# Patient Record
Sex: MALE | Race: WHITE | NOT HISPANIC OR LATINO | Employment: FULL TIME | URBAN - METROPOLITAN AREA
[De-identification: names, ages, dates, MRNs, and addresses within clinical notes are randomized per-mention and may not be internally consistent; named-entity substitution may affect disease eponyms.]

---

## 2017-01-20 ENCOUNTER — GENERIC CONVERSION - ENCOUNTER (OUTPATIENT)
Dept: OTHER | Facility: OTHER | Age: 27
End: 2017-01-20

## 2017-12-29 ENCOUNTER — ALLSCRIPTS OFFICE VISIT (OUTPATIENT)
Dept: OTHER | Facility: OTHER | Age: 27
End: 2017-12-29

## 2017-12-29 LAB
FLUAV AG SPEC QL IA: NEGATIVE
INFLUENZA B AG (HISTORICAL): NEGATIVE

## 2017-12-30 NOTE — PROGRESS NOTES
Assessment   1  Acute sinusitis (461 9) (J01 90)   2  Flu-like symptoms (780 99) (R68 89)    Plan   Acute sinusitis    · Amoxicillin 500 MG Oral Capsule; TAKE 2 CAPSULE Twice daily  Flu-like symptoms    · Rapid Flu A and B- POC; Source:Nose; Status:Resulted - Requires Verification;   Done:    [id number] 12:00AM    Discussion/Summary      Patient is a 39-year-old male here with frontal tenderness, nasal congestion, thick postnasal drip and productive cough  On auscultation, his lungs are clear  His skin felt very clammy and he complains of achiness  I did do a flu screen and it was negative  Am treating her for an acute sinus infection with amoxicillin for 10 days  He can use Delsym or Mucinex over-the-counter for his cough and also I recommended Nasacort or Flonase nasal spray  He should push fluids and may take Tylenol or Advil for fever  He should call if he has no improvement  Possible side effects of new medications were reviewed with the patient/guardian today  The treatment plan was reviewed with the patient/guardian  The patient/guardian understands and agrees with the treatment plan      Chief Complaint   Pt presents for cough, congestion, and body aches x1wk  Pt states he took Delcum and Tylenol w/ little relief  History of Present Illness   HPI: Patient had a cold  last week which resolved and now he has cough productive of yellow mucous  , ache, tired      Review of Systems        Constitutional: feeling tired, but-- no fever-- and-- no chills  ENT: nasal discharge-- and-- Headache across frontal region  , but-- no earache-- and-- no sore throat  Cardiovascular: no complaints of slow or fast heart rate, no chest pain, no palpitations, no leg claudication or lower extremity edema  Respiratory: cough  Gastrointestinal: diarrhea, but-- no nausea-- and-- no vomiting  Active Problems   1  Acute upper respiratory infection (465 9) (J06 9)   2   Psoriasis (696 1) (L40 9)    Past Medical History   1  Acute upper respiratory infection (465 9) (J06 9)   2  History of Acute upper respiratory infection (465 9) (J06 9)   3  History of Childhood asthma (493 00) (J45 909)   4  History of Cough (786 2) (R05)   5  History of common cold (V12 09) (Z86 19)   6  History of Need for influenza vaccination (V04 81) (Z23)  Active Problems And Past Medical History Reviewed: The active problems and past medical history were reviewed and updated today  Family History   Father    1  Family history of Psoriasis (696 1) (L40 9)  Paternal Grandfather    2  Family history of Diabetes mellitus (250 00) (E11 9)    Social History    · Never a smoker   · Occasional alcohol use  The social history was reviewed and updated today  The social history was reviewed and is unchanged  Current Meds      The medication list was reviewed and updated today  Allergies   1  No Known Drug Allergies  2  No Known Food Allergies    Vitals    Recorded: 81Btk2425 08:38AM   Temperature 97 8 F, Tympanic   Heart Rate 90   Pulse Quality Normal   Respiration Quality Normal   Respiration 18   Systolic 090, Sitting   Diastolic 80, Sitting   Height 5 ft 10 in   Weight 191 lb    BMI Calculated 27 41   BSA Calculated 2 05   O2 Saturation 98, RA   Pain Scale 2   Waist Circumference 2     Physical Exam        Constitutional      General appearance: No acute distress, well appearing and well nourished  Ears, Nose, Mouth, and Throat      External inspection of ears and nose: Normal        Otoscopic examination: Tympanic membrance translucent with normal light reflex  Canals patent without erythema  Nasal mucosa, septum, and turbinates: Abnormal   There was a mucoid discharge from both nares  The bilateral nasal mucosa was red  Oropharynx: Abnormal   The posterior pharynx with post nasal drainage  Pulmonary      Respiratory effort: No increased work of breathing or signs of respiratory distress         Auscultation of lungs: Clear to auscultation, equal breath sounds bilaterally, no wheezes, no rales, no rhonci  Cardiovascular      Auscultation of heart: Normal rate and rhythm, normal S1 and S2, without murmurs  Examination of extremities for edema and/or varicosities: Normal        Lymphatic      Palpation of lymph nodes in neck: No lymphadenopathy  Musculoskeletal      Gait and station: Normal        Skin      Skin and subcutaneous tissue: Abnormal  -- Clammy        Psychiatric      Orientation to person, place and time: Normal        Mood and affect: Normal           Signatures    Electronically signed by : Marcio Cifuentes DO; Dec 29 2017  9:33AM EST                       (Author)

## 2018-01-04 ENCOUNTER — ALLSCRIPTS OFFICE VISIT (OUTPATIENT)
Dept: OTHER | Facility: OTHER | Age: 28
End: 2018-01-04

## 2018-01-06 NOTE — PROGRESS NOTES
Assessment   1  Acute bronchitis, unspecified organism (466 0) (J20 9)    Plan   Acute bronchitis, unspecified organism    · PredniSONE 10 MG Oral Tablet; 6-5-4-3-2-1 taper with food   · Promethazine-Codeine 6 25-10 MG/5ML Oral Syrup; TAKE 1 TSP Every 6 hours    PRN    Discussion/Summary      Discussed condition with pt  His sinusitis is resolving but in the interim between his last visit and today, he appears to have developed bronchitis  I would rec  he continue Amoxicillin until gone and I will prescribe him an oral Prednisone taper and a cough syrup and he is to hydrate, rest, and observe  He is to F/U if symptoms continue to persist     Possible side effects of new medications were reviewed with the patient/guardian today  The treatment plan was reviewed with the patient/guardian  The patient/guardian understands and agrees with the treatment plan      Chief Complaint   patient presents for follow up on 12/29/17 visit  Reports he still has his cough and it has gotten progressively worse  He reports he is still continuing his amox and his sinus symptoms has improved      History of Present Illness   Pt  presents for F/U from visit on 12/29/17  He has been taking Amoxicillin for the past week and his sinuses are improved but his cough has actually gotten worse  He reports the cough was productive but now it is dry  He has minimal mucus/congestion  Denies fever, chills  He is taking Delsym and Mucinex which are not helping  Review of Systems        Constitutional: No fever or chills, feels well, no tiredness, no recent weight gain or weight loss  ENT: no complaints of earache, no hearing loss, no nosebleeds, no nasal discharge, no sore throat, no hoarseness  Cardiovascular: No complaints of slow heart rate, no fast heart rate, no chest pain, no palpitations, no leg claudication, no lower extremity  Respiratory: as noted in HPI        Gastrointestinal: No complaints of abdominal pain, no constipation, no nausea or vomiting, no diarrhea or bloody stools  Genitourinary: No complaints of dysuria, no incontinence, no hesitancy, no nocturia, no genital lesion, no testicular pain  Past Medical History   1  Acute upper respiratory infection (465 9) (J06 9)   2  History of Acute upper respiratory infection (465 9) (J06 9)   3  History of Childhood asthma (493 00) (J45 909)   4  History of Cough (786 2) (R05)   5  History of common cold (V12 09) (Z86 19)   6  History of Need for influenza vaccination (V04 81) (Z23)    Family History   Father    1  Family history of Psoriasis (696 1) (L40 9)  Paternal Grandfather    2  Family history of Diabetes mellitus (250 00) (E11 9)    Social History    · Never a smoker   · Occasional alcohol use  The social history was reviewed and is unchanged  Current Meds    1  Amoxicillin 500 MG Oral Capsule; TAKE 2 CAPSULE Twice daily; Therapy: [id number] to ([phone] [id number])  Requested for: [phone]; Last     Rx:[id number] Ordered     The medication list was reviewed and updated today  Allergies   1  No Known Drug Allergies  2  No Known Food Allergies    Vitals   Vital Signs    Recorded: 17MGQ5393 10:51AM   Temperature 98 2 F, Tympanic   Heart Rate 84   Systolic 835   Diastolic 68   Height 5 ft    Weight 190 lb    BMI Calculated 37 11   BSA Calculated 1 83   O2 Saturation 99     Physical Exam        Constitutional      General appearance: No acute distress, well appearing and well nourished  Ears, Nose, Mouth, and Throat      External inspection of ears and nose: Normal        Otoscopic examination: Tympanic membrance translucent with normal light reflex  Canals patent without erythema  Nasal mucosa, septum, and turbinates: Normal without edema or erythema  Oropharynx: Abnormal  -- PND; no exudates  Pulmonary      Respiratory effort: No increased work of breathing or signs of respiratory distress         Auscultation of lungs: Abnormal  -- B/L diffuse coarse breath sounds heard throughout; no wheezes  Cardiovascular      Auscultation of heart: Normal rate and rhythm, normal S1 and S2, without murmurs  Lymphatic      Palpation of lymph nodes in neck: No lymphadenopathy  Psychiatric      Orientation to person, place and time: Normal        Mood and affect: Normal        Additional Exam:  Vital signs were reviewed; neck supple  Signatures    Electronically signed by : [facility]; Jan 5 2018  8:35AM EST                       (Author)     Electronically signed by :  Gifty Stovall DO; Jan 5 2018 12:50PM EST                       (Author)

## 2018-01-22 VITALS
TEMPERATURE: 97.8 F | DIASTOLIC BLOOD PRESSURE: 80 MMHG | OXYGEN SATURATION: 98 % | HEART RATE: 90 BPM | WEIGHT: 191 LBS | RESPIRATION RATE: 18 BRPM | HEIGHT: 70 IN | BODY MASS INDEX: 27.35 KG/M2 | SYSTOLIC BLOOD PRESSURE: 118 MMHG

## 2018-01-23 VITALS
DIASTOLIC BLOOD PRESSURE: 68 MMHG | WEIGHT: 190 LBS | BODY MASS INDEX: 37.3 KG/M2 | SYSTOLIC BLOOD PRESSURE: 116 MMHG | HEART RATE: 84 BPM | TEMPERATURE: 98.2 F | OXYGEN SATURATION: 99 % | HEIGHT: 60 IN

## 2018-02-08 ENCOUNTER — TRANSCRIBE ORDERS (OUTPATIENT)
Dept: ADMINISTRATIVE | Facility: HOSPITAL | Age: 28
End: 2018-02-08

## 2018-02-08 ENCOUNTER — OFFICE VISIT (OUTPATIENT)
Dept: FAMILY MEDICINE CLINIC | Facility: CLINIC | Age: 28
End: 2018-02-08
Payer: COMMERCIAL

## 2018-02-08 ENCOUNTER — APPOINTMENT (OUTPATIENT)
Dept: RADIOLOGY | Facility: MEDICAL CENTER | Age: 28
End: 2018-02-08
Payer: COMMERCIAL

## 2018-02-08 VITALS
HEIGHT: 68 IN | DIASTOLIC BLOOD PRESSURE: 80 MMHG | WEIGHT: 195 LBS | BODY MASS INDEX: 29.55 KG/M2 | RESPIRATION RATE: 18 BRPM | HEART RATE: 82 BPM | OXYGEN SATURATION: 98 % | TEMPERATURE: 98.9 F | SYSTOLIC BLOOD PRESSURE: 118 MMHG

## 2018-02-08 DIAGNOSIS — J40 BRONCHITIS: Primary | ICD-10-CM

## 2018-02-08 DIAGNOSIS — L40.9 PSORIASIS: ICD-10-CM

## 2018-02-08 DIAGNOSIS — J40 BRONCHITIS: ICD-10-CM

## 2018-02-08 PROCEDURE — 71046 X-RAY EXAM CHEST 2 VIEWS: CPT

## 2018-02-08 PROCEDURE — 99213 OFFICE O/P EST LOW 20 MIN: CPT | Performed by: FAMILY MEDICINE

## 2018-02-08 RX ORDER — ADALIMUMAB 40MG/0.8ML
KIT SUBCUTANEOUS
COMMUNITY
Start: 2017-12-12

## 2018-02-08 RX ORDER — MONTELUKAST SODIUM 10 MG/1
10 TABLET ORAL
Qty: 30 TABLET | Refills: 0 | Status: SHIPPED | OUTPATIENT
Start: 2018-02-08 | End: 2022-02-04 | Stop reason: SDDI

## 2018-02-08 NOTE — PROGRESS NOTES
Assessment/Plan:  Patient continues with bronchitis or inflammatory cough  I have given him an inhaler to use - Symbicort and prescribed Singulair to help with cough and inflammation  I am checking an xray since he has had his cough for greater than a month  He takes Humira for psoraisis  I am going to hold off on antibiotics until I have the xray results  No problem-specific Assessment & Plan notes found for this encounter  Diagnoses and all orders for this visit:    Bronchitis  -     XR chest pa & lateral; Future  -     montelukast (SINGULAIR) 10 mg tablet; Take 1 tablet (10 mg total) by mouth daily at bedtime for 30 days    Psoriasis    Other orders  -     HUMIRA PEN 40 MG/0 8ML PNKT;           Vitals:    02/08/18 1356   BP: 118/80   Pulse: 82   Resp: 18   Temp: 98 9 °F (37 2 °C)   SpO2: 98%       Subjective:      Patient ID: Dimerick Delay Friday is a 28 y o  male  Patient is here with cough  I had seen him at the end of December and treated him for a sinus infection  His sinus pressure is gone, but the cough lingers  He was seen at the beginning of January and put on a steroid taper  He took the Prednisone as directed  His cough is worse when he is active  Not bad when laying down  Had asthma as a child  The following portions of the patient's history were reviewed and updated as appropriate: allergies, current medications, past family history, past medical history, past social history, past surgical history and problem list     Review of Systems   Constitutional: Positive for fatigue  Negative for chills and fever  HENT: Negative for congestion and sore throat  Respiratory: Positive for cough  Cardiovascular: Negative for chest pain and palpitations  Gastrointestinal: Negative  Genitourinary: Negative  Neurological: Positive for headaches (from coughing hard)  Psychiatric/Behavioral: Negative            Objective:     Physical Exam   Constitutional: He is oriented to person, place, and time  He appears well-developed and well-nourished  HENT:   Head: Normocephalic  Right Ear: Tympanic membrane normal    Left Ear: Tympanic membrane normal    Nose: No mucosal edema or rhinorrhea  Mouth/Throat: Mucous membranes are normal  Posterior oropharyngeal erythema present  No oropharyngeal exudate  Neck: No thyromegaly present  Cardiovascular: Normal rate and regular rhythm  Pulmonary/Chest: Effort normal    Coarse breath sounds  Lymphadenopathy:     He has no cervical adenopathy  Neurological: He is alert and oriented to person, place, and time  Skin: Skin is warm and dry  Psychiatric: He has a normal mood and affect

## 2018-02-08 NOTE — PATIENT INSTRUCTIONS
Use Symbicort inhaler - two puffs twice a day  Rinse mouth after using  Take singulair once a day - (montelukast)  We will call with chest xray results

## 2018-02-09 DIAGNOSIS — J40 BRONCHITIS: Primary | ICD-10-CM

## 2018-02-09 RX ORDER — AZITHROMYCIN 250 MG/1
TABLET, FILM COATED ORAL
Qty: 6 TABLET | Refills: 0 | Status: SHIPPED | OUTPATIENT
Start: 2018-02-09 | End: 2018-02-13

## 2018-02-09 RX ORDER — BUDESONIDE AND FORMOTEROL FUMARATE DIHYDRATE 160; 4.5 UG/1; UG/1
2 AEROSOL RESPIRATORY (INHALATION) 2 TIMES DAILY
Qty: 1 INHALER | Refills: 0 | Status: SHIPPED | COMMUNITY
Start: 2018-02-09 | End: 2022-02-04 | Stop reason: SDDI

## 2020-02-20 ENCOUNTER — OFFICE VISIT (OUTPATIENT)
Dept: FAMILY MEDICINE CLINIC | Facility: CLINIC | Age: 30
End: 2020-02-20
Payer: COMMERCIAL

## 2020-02-20 VITALS
HEIGHT: 68 IN | WEIGHT: 190 LBS | BODY MASS INDEX: 28.79 KG/M2 | DIASTOLIC BLOOD PRESSURE: 74 MMHG | HEART RATE: 121 BPM | OXYGEN SATURATION: 95 % | TEMPERATURE: 102 F | SYSTOLIC BLOOD PRESSURE: 108 MMHG

## 2020-02-20 DIAGNOSIS — R68.89 FLU-LIKE SYMPTOMS: ICD-10-CM

## 2020-02-20 DIAGNOSIS — Z11.4 ENCOUNTER FOR SCREENING FOR HIV: ICD-10-CM

## 2020-02-20 DIAGNOSIS — J01.90 ACUTE NON-RECURRENT SINUSITIS, UNSPECIFIED LOCATION: Primary | ICD-10-CM

## 2020-02-20 DIAGNOSIS — R74.8 ELEVATED LIVER ENZYMES: ICD-10-CM

## 2020-02-20 PROCEDURE — 4004F PT TOBACCO SCREEN RCVD TLK: CPT | Performed by: FAMILY MEDICINE

## 2020-02-20 PROCEDURE — 99214 OFFICE O/P EST MOD 30 MIN: CPT | Performed by: FAMILY MEDICINE

## 2020-02-20 PROCEDURE — 3008F BODY MASS INDEX DOCD: CPT | Performed by: FAMILY MEDICINE

## 2020-02-20 RX ORDER — OSELTAMIVIR PHOSPHATE 75 MG/1
75 CAPSULE ORAL EVERY 12 HOURS SCHEDULED
Qty: 10 CAPSULE | Refills: 0 | Status: SHIPPED | OUTPATIENT
Start: 2020-02-20 | End: 2020-02-25

## 2020-02-20 RX ORDER — CEFUROXIME AXETIL 500 MG/1
500 TABLET ORAL EVERY 12 HOURS SCHEDULED
Qty: 20 TABLET | Refills: 0 | Status: SHIPPED | OUTPATIENT
Start: 2020-02-20 | End: 2020-03-01

## 2020-02-21 NOTE — PROGRESS NOTES
Assessment/Plan:  Patient is here with acute sinusitis and possible flu - I did prescribe Tamiflu to decrease flu symptoms and treated him for a secondary sinus infection with an antibiotic  He also has elevated liver enzymes - he sees dermatology and is on Humira for psoriasis  I have ordered repeat blood work - liver enzymes, along with check of hepatitis  If liver enzymes still elevated and Hepatitis testing negative, will check liver US     Diagnoses and all orders for this visit:    Acute non-recurrent sinusitis, unspecified location  -     cefuroxime (CEFTIN) 500 mg tablet; Take 1 tablet (500 mg total) by mouth every 12 (twelve) hours for 10 days    Flu-like symptoms  -     oseltamivir (TAMIFLU) 75 mg capsule; Take 1 capsule (75 mg total) by mouth every 12 (twelve) hours for 5 days    Elevated liver enzymes  -     Comprehensive metabolic panel; Future  -     Hepatitis C Ab W/Refl To HCV RNA, Qn, PCR; Future  -     Hepatitis B surface antigen; Future  -     Hepatitis B surface antibody; Future  -     Hepatitis A antibody, IgM; Future  -     Lipid Panel with Direct LDL reflex; Future    Encounter for screening for HIV  -     HIV 1/2 AG-AB combo; Future          Subjective:   Chief Complaint   Patient presents with    Follow-up     review blood work   Cough     Started aboout 2 weeks ago    Fever     started about 3 days ago    Fatigue     started about 3 days ago    Generalized Body Aches     started about 3 days ago        Patient ID: Ramiro Osler Friday is a 26 y o  male  Patient is here for follow up of abnormal blood work done by dermatology  Was seeing  Derm to restart Humira  Blood work showed his lier enzymes are elvated  But started with cough for the past two weeks  Then two to three days ago bad cough, fever and aches  Drinks 2-3 glasses of beer once a week  Been taking Robitussin and Tylenol but not before the blood work  Works in a warehouse        The following portions of the patient's history were reviewed and updated as appropriate: allergies, current medications, past family history, past medical history, past social history, past surgical history and problem list     Review of Systems   Constitutional: Negative for chills and fever  HENT: Negative for congestion and sore throat  Respiratory: Positive for cough  Negative for chest tightness  Cardiovascular: Negative for chest pain and palpitations  Gastrointestinal: Negative for abdominal pain, constipation, diarrhea, nausea and rectal pain  Genitourinary: Negative for difficulty urinating  Skin: Negative  Neurological: Negative for dizziness and headaches  Psychiatric/Behavioral: Negative  Objective:      /74 (BP Location: Left arm, Patient Position: Sitting, Cuff Size: Adult)   Pulse (!) 121   Temp (!) 102 °F (38 9 °C) (Tympanic)   Ht 5' 8" (1 727 m)   Wt 86 2 kg (190 lb)   SpO2 95%   BMI 28 89 kg/m²          Physical Exam   Constitutional: He is oriented to person, place, and time  He appears well-nourished  No distress  HENT:   Right Ear: Tympanic membrane normal    Left Ear: Tympanic membrane normal    Nose: Right sinus exhibits maxillary sinus tenderness and frontal sinus tenderness  Left sinus exhibits maxillary sinus tenderness and frontal sinus tenderness  Cardiovascular: Normal rate, regular rhythm and normal heart sounds  Pulmonary/Chest: Effort normal and breath sounds normal    Lymphadenopathy:     He has no cervical adenopathy  Neurological: He is alert and oriented to person, place, and time  Skin: Skin is warm and dry  Psychiatric: He has a normal mood and affect  Nursing note and vitals reviewed

## 2020-02-28 ENCOUNTER — TELEPHONE (OUTPATIENT)
Dept: FAMILY MEDICINE CLINIC | Facility: CLINIC | Age: 30
End: 2020-02-28

## 2020-02-28 NOTE — TELEPHONE ENCOUNTER
PT was seen by you on 2/20 and dx with flu  Pt needs forms filled out for work  Placing on your desk

## 2020-03-02 ENCOUNTER — TELEPHONE (OUTPATIENT)
Dept: FAMILY MEDICINE CLINIC | Facility: CLINIC | Age: 30
End: 2020-03-02

## 2020-03-02 NOTE — TELEPHONE ENCOUNTER
Patient states that he was sen for the flu 2/20/20 and is feeling much better after taking the Tamiflu  States he dropped off paperwork Friday to clear him to return to work 3/4/20  Would like to know when those forms are completed

## 2020-03-04 ENCOUNTER — TELEPHONE (OUTPATIENT)
Dept: FAMILY MEDICINE CLINIC | Facility: CLINIC | Age: 30
End: 2020-03-04

## 2020-03-04 NOTE — TELEPHONE ENCOUNTER
There was a possible miscommunication with out of work paperwork, pt was not excused for today  Pt will speak with employer and decide if he needs a note explaining if he is excused for today or not

## 2020-11-24 ENCOUNTER — TELEPHONE (OUTPATIENT)
Dept: PSYCHIATRY | Facility: CLINIC | Age: 30
End: 2020-11-24

## 2021-01-29 ENCOUNTER — TELEMEDICINE (OUTPATIENT)
Dept: FAMILY MEDICINE CLINIC | Facility: CLINIC | Age: 31
End: 2021-01-29
Payer: COMMERCIAL

## 2021-01-29 DIAGNOSIS — J01.00 ACUTE NON-RECURRENT MAXILLARY SINUSITIS: Primary | ICD-10-CM

## 2021-01-29 PROCEDURE — 99213 OFFICE O/P EST LOW 20 MIN: CPT | Performed by: FAMILY MEDICINE

## 2021-01-29 PROCEDURE — 4004F PT TOBACCO SCREEN RCVD TLK: CPT | Performed by: FAMILY MEDICINE

## 2021-01-29 RX ORDER — AMOXICILLIN AND CLAVULANATE POTASSIUM 875; 125 MG/1; MG/1
1 TABLET, FILM COATED ORAL EVERY 12 HOURS SCHEDULED
Qty: 14 TABLET | Refills: 0 | Status: SHIPPED | OUTPATIENT
Start: 2021-01-29 | End: 2021-02-05

## 2021-01-29 NOTE — PROGRESS NOTES
Virtual Regular Visit      Assessment/Plan:    Problem List Items Addressed This Visit     None      Visit Diagnoses     Acute non-recurrent maxillary sinusitis    -  Primary    Relevant Medications    amoxicillin-clavulanate (AUGMENTIN) 875-125 mg per tablet       will give Rx for Augmentin b i d  for 7 days  Drink plenty of fluids  Recommend self quarantine in still symptoms have improved  Call further problems         Reason for visit is No chief complaint on file  Encounter provider Andrea Gold DO    Provider located at [place] [address]  [address] [place] [address] [address] [facility] Shayne Coronado [facility] [address]  [phone]      Recent Visits  No visits were found meeting these conditions  Showing recent visits within past 7 days and meeting all other requirements     Today's Visits  Date Type Provider Dept   01/29/21 [place]medicine [place] [id number] Gary Phillips today's visits and meeting all other requirements     Future Appointments  No visits were found meeting these conditions  Showing future appointments within next 150 days and meeting all other requirements        The patient was identified by name and date of birth  Anay Hui Friday was informed that this is a telemedicine visit and that the visit is being conducted through [facility] and patient was informed that this is a secure, HIPAA-compliant platform  He agrees to proceed     My office door was closed  No one else was in the room  He acknowledged consent and understanding of privacy and security of the video platform  The patient has agreed to participate and understands they can discontinue the visit at any time  Patient is aware this is a billable service  Subjective  Anay Hui Friday is a 31 y o  male  See HPI   Patient complains of 2 week history of nasal congestion with dark mucus  Denies any fevers or chills    Denies any known exposure to COVID positive individual       Past Medical History: Diagnosis Date    Childhood asthma     Psoriasis     last assessed 9/4/14       Past Surgical History:   Procedure Laterality Date    HERNIA REPAIR         Current Outpatient Medications   Medication Sig Dispense Refill    amoxicillin-clavulanate (AUGMENTIN) 875-125 mg per tablet Take 1 tablet by mouth every 12 (twelve) hours for 7 days 14 tablet 0    budesonide-formoterol (SYMBICORT) 160-4 5 mcg/act inhaler Inhale 2 puffs 2 (two) times a day (Patient not taking: Reported on 2/20/2020) 1 Inhaler 0    HUMIRA PEN 40 MG/0 8ML PNKT       montelukast (SINGULAIR) 10 mg tablet Take 1 tablet (10 mg total) by mouth daily at bedtime for 30 days (Patient not taking: Reported on 2/20/2020) 30 tablet 0     No current facility-administered medications for this visit  No Known Allergies    Review of Systems   Constitutional: Negative for chills and fever  HENT: Positive for congestion and postnasal drip  Respiratory: Positive for cough  Negative for shortness of breath  Cardiovascular: Negative  Video Exam    There were no vitals filed for this visit  Physical Exam     I spent Eight minutes directly with the patient during this visit      VIRTUAL VISIT DISCLAIMER    Mao Shea Friday acknowledges that he has consented to an online visit or consultation  He understands that the online visit is based solely on information provided by him, and that, in the absence of a face-to-face physical evaluation by the physician, the diagnosis he receives is both limited and provisional in terms of accuracy and completeness  This is not intended to replace a full medical face-to-face evaluation by the physician  Mao Shea Friday understands and accepts these terms

## 2021-04-09 ENCOUNTER — OFFICE VISIT (OUTPATIENT)
Dept: FAMILY MEDICINE CLINIC | Facility: CLINIC | Age: 31
End: 2021-04-09
Payer: COMMERCIAL

## 2021-04-09 VITALS
WEIGHT: 181.4 LBS | HEART RATE: 74 BPM | OXYGEN SATURATION: 98 % | BODY MASS INDEX: 27.49 KG/M2 | HEIGHT: 68 IN | DIASTOLIC BLOOD PRESSURE: 78 MMHG | SYSTOLIC BLOOD PRESSURE: 118 MMHG | TEMPERATURE: 98.3 F

## 2021-04-09 DIAGNOSIS — Z13.1 SCREENING FOR DIABETES MELLITUS: ICD-10-CM

## 2021-04-09 DIAGNOSIS — Z13.220 SCREENING FOR LIPID DISORDERS: ICD-10-CM

## 2021-04-09 DIAGNOSIS — R35.0 URINARY FREQUENCY: Primary | ICD-10-CM

## 2021-04-09 DIAGNOSIS — Z13.0 SCREENING FOR DEFICIENCY ANEMIA: ICD-10-CM

## 2021-04-09 LAB
SL AMB  POCT GLUCOSE, UA: NEGATIVE
SL AMB LEUKOCYTE ESTERASE,UA: NEGATIVE
SL AMB POCT BILIRUBIN,UA: NEGATIVE
SL AMB POCT BLOOD,UA: NEGATIVE
SL AMB POCT CLARITY,UA: CLEAR
SL AMB POCT COLOR,UA: YELLOW
SL AMB POCT KETONES,UA: NEGATIVE
SL AMB POCT NITRITE,UA: NEGATIVE
SL AMB POCT PH,UA: 5
SL AMB POCT SPECIFIC GRAVITY,UA: >=1.03
SL AMB POCT URINE PROTEIN: NEGATIVE
SL AMB POCT UROBILINOGEN: 0.2

## 2021-04-09 PROCEDURE — 1036F TOBACCO NON-USER: CPT | Performed by: NURSE PRACTITIONER

## 2021-04-09 PROCEDURE — 3008F BODY MASS INDEX DOCD: CPT | Performed by: NURSE PRACTITIONER

## 2021-04-09 PROCEDURE — 99213 OFFICE O/P EST LOW 20 MIN: CPT | Performed by: NURSE PRACTITIONER

## 2021-04-09 PROCEDURE — 81003 URINALYSIS AUTO W/O SCOPE: CPT | Performed by: NURSE PRACTITIONER

## 2021-04-09 PROCEDURE — 3725F SCREEN DEPRESSION PERFORMED: CPT | Performed by: NURSE PRACTITIONER

## 2021-04-09 NOTE — PROGRESS NOTES
[facility]    ASSESSMENT AND PLAN     1  Urinary frequency  Symptoms reviewed with pt and girlfriend  Urine dip negative  Pt ingests a very large amount of caffeine daily  Advised this may cause bladder irritation and could be the cause of his symptoms  Note his food diet is poor as well  Discussed proper dietary intake  Advised he keep a food/lquid diet and monitor for symptoms  Advised to decrease caffeine intake and increase water  Goal of 6-8 bottles per day  Dietary changes should help regular stool consistency as well as decrease urinary symtpoms  Believe blood spots are due to straining/constipation (only occur at those times)  Consider referral if returns  Assessment today unremarkable  He is overdue for comprehensive physical and lab work  Orders placed today  Advised he return in the next few weeks for complete exam after completing labs  He treats with [facility] for psoriasis and has run out of his Humira  States he will likely need lab work through them and would prefer to wait and it all completed at the same time  He has not scheduled with Derm yet so this may take several weeks to get in  Pt was advised to return for further work up sooner if symptoms persist, change or worsen - despite lifestyle changes as discussed above  Pt and girlfriend state understanding     - POCT urine dip auto non-scope    2  Screening for diabetes mellitus    - Comprehensive metabolic panel; Future    3  Screening for deficiency anemia    - CBC and differential; Future    4  Screening for lipid disorders    - Lipid panel; Future            SUBJECTIVE       Patient ID: Yuki Alvarado Friday is a 31 y o  male  Chief Complaint   Patient presents with    Urinary Incontinence     Patient had episode last week when he had no warning, and started leaking urine    Urinary Frequency     patient has intermittent frequency       HISTORY OF PRESENT ILLNESS    Patient presents today with urinary complaint    Symptoms intermittent present for the last few weeks  Notes urinary frequency/ urgency  Feels like he leaks urine at times and that he does not completely empty his bladder  Denies any pain or burning  Admits to poor diet  States he drinks 1-2 energy drinks per day ) [employer] ), and then drinks either ice tea or hot tea with caffeine the remainder of the day  Drinks very little water  Complains also of feeling like his bowels "give up"  He feels he does not always empty his bowels, and takes several wipes before the toilet tissue is clean  States he does have daily bowel movements  Notes his bowels to be hard and difficult to pass 1-2 times per week  Otherwise his bowels seem more "mushy"  Color is brown  States he will occasionally have a smear of blood on the toilet tissue after straining hard to evacuate  No blood in the stool or in the toilet  Only notes the smear of blood on the toilet tissue after hard stools only and when straining to pass  Mild pain at this time only  This is not new  Denies any rectal pain, bleeding, itching, burning otherwise  Admits to a poor diet  Eats a lot of fast foods (hamburgers/pizza)  Will occasionally eat a salad  Urinary Frequency   Associated symptoms include frequency and urgency  Pertinent negatives include no flank pain, nausea or vomiting  The following portions of the patient's history were reviewed and updated as appropriate: allergies, current medications, past family history, past medical history, past social history, past surgical history and problem list     REVIEW OF SYSTEMS  Review of Systems   Gastrointestinal: Positive for constipation  Negative for diarrhea, nausea and vomiting  Genitourinary: Positive for frequency and urgency  Negative for decreased urine volume, difficulty urinating, dysuria and flank pain         OBJECTIVE      VITAL SIGNS  /78 (BP Location: Right arm)   Pulse 74   Temp 98 3 °F (36 8 °C)   Ht 5' 7 72" (1 72 m)   Wt 82 3 kg (181 lb 6 4 oz)   SpO2 98%   BMI 27 81 kg/m²     CURRENT MEDICATIONS    Current Outpatient Medications:     HUMIRA PEN 40 MG/0 8ML PNKT, , Disp: , Rfl:     budesonide-formoterol (SYMBICORT) 160-4 5 mcg/act inhaler, Inhale 2 puffs 2 (two) times a day (Patient not taking: Reported on 2/20/2020), Disp: 1 Inhaler, Rfl: 0    montelukast (SINGULAIR) 10 mg tablet, Take 1 tablet (10 mg total) by mouth daily at bedtime for 30 days (Patient not taking: Reported on 2/20/2020), Disp: 30 tablet, Rfl: 0      PHYSICAL EXAMINATION   Physical Exam  Vitals signs and nursing note reviewed  Constitutional:       General: He is not in acute distress  Appearance: Normal appearance  He is not ill-appearing  Abdominal:      Tenderness: There is no abdominal tenderness  There is no right CVA tenderness or left CVA tenderness  Genitourinary:     Rectum: No anal fissure or external hemorrhoid  Skin:     General: Skin is warm and dry  Neurological:      Mental Status: He is alert and oriented to person, place, and time     Psychiatric:         Attention and Perception: Attention normal          Mood and Affect: Mood normal          Speech: Speech normal          Behavior: Behavior normal

## 2021-04-20 ENCOUNTER — TELEPHONE (OUTPATIENT)
Dept: PSYCHIATRY | Facility: CLINIC | Age: 31
End: 2021-04-20

## 2021-04-22 ENCOUNTER — TELEPHONE (OUTPATIENT)
Dept: PSYCHIATRY | Facility: CLINIC | Age: 31
End: 2021-04-22

## 2021-04-22 NOTE — TELEPHONE ENCOUNTER
Behavorial Health Outpatient Intake Questions    Referred by: Olena Boateng     Please advised interviewee that they need to answer all questions truthfully to allow for best care and any misrepresentations of information may affect their ability to be seen at this clinic   => Was this discussed? Yes     Behavorial Health Outpatient Intake History -     Presenting Problem (in patient's words):   Patient has been seeing his therapist for about 1 year now  Patient anxiety and main thing is experiencing "bubble thinking"  Patient states "has found himself over thinking a lot"    Are there any developmental disabilities? ? If yes, can they speak to you on the phone? If they are too limited to speak to you on phone, refer out No    Are you taking any psychiatric medications? No    => If yes, who prescribes? If yes, are they injectable medications? Does the patient have a language barrier or hearing impairment? No    Have you been treated at [facility] by a therapist or a doctor in the past? If yes, who? Yes    Has the patient been hospitalized for mental health? No   If yes, how long ago was last hospitalization and where was it? Do you actively use alcohol or marijuana or illegal substances? If yes, what and how much - refer out to Drug and alcohol treatment if use is excessive or daily use of illegal substances No concerns of substance abuse are reported  Do you have a community treatment team or ? No    Legal History-     Does the patient have any history of arrests, detention/half-way time, or DUIs? No  If Yes-  1) What types of charges? 2) When were they last incarcerated? 3) Are they currently on parole or probation? Minor Child-    Who has custody of the child? Is there a custody agreement? If there is a custody agreement remind parent that they must bring a copy to the first appt or they will not be seen       Intake Team, please check with provider before scheduling if flags come up such as:  - complex case  - legal history (other than DUI)  - communication barrier concerns are present  - if, in your judgment, this needs further review    ACCEPTED as a patient Yes  => Appointment Date: 09/03/2021 at 2:00 p m with Dr Hunter Bradley? No    Name of Insurance Co: [employer] ID# [id number]  Insurance Phone #  If ins is primary or secondary  If patient is a minor, parents information such as Name, D  O B of guarantor

## 2021-09-02 NOTE — PSYCH
[facility]    Name and Date of Birth:  Joshua Bi Friday 27 y o  1990 MRN: [id number]    Date of Visit: September 3, 2021    Reason for visit:   Chief Complaint   Patient presents with   [facility]    Medication Management    Anxiety       HPI:     Joshua Bi Friday is a 27 y o   male, single, domiciled w/ a roommate, employed at [employer], w/ [address] [place] [address] [employer] [facility] of psoriasis and bronchitis and PPH of anxiety, no prior psychiatric admissions, one prior SA (by drinking alcohol and cutting in 2016), no h/o self-injurious behavior, in individual therapy since 2020 by Laney Bethea who presented to the mental health clinic for the initial intake and psychiatric evaluation on 9/3/2021 as referred by his therapist      The pt was visited in the clinic; chart reviewed  Presented calm, cooperative and well related, casually dressed w/ good hygiene, ling hear, bearded, wearing a facemask and eye-glasses, good eye contact, anxious mood, constricted affect, talking in normal tone, volume and amount, w/ linear thought process, fair insight and judgement  He stated that he "tend[s] to obsessively overthink things"  For example his roommate bought him a bag of chips, and then he startrd thinking if he  wanted him to do something for him, or buy something for him, etc and he keeps thinking about several scenarios  He noted that he noticed this pattern started in high school, and has been the same until now  He noted that his ex-friend was cheating on him and he decided to "drink myself to death" as he drank from 6 AM until night and then cut his foot, but did not end up to the hospital, and since then tried to seek help  He has started individual therapy for past two years  He noticed "less self-doubts" and uses mindfulness to feel calmer and remain in control  He scored his mood 6-7/10 (10 for the best)   Endorsed good energy level and appetite  Reported initial insomnia at times, but in general sleeps for ~8 hours  Enjoys playing video games, plays Interwise (recenly less motivated though) and Siperian  Denied anhedonia, hopelessness, helplessness, worthlessness or feeling guilty (since started therapy)  Reported arachnophobia  No panic attacks reported  Denied A/VH  No manic sxs, paranoid ideations or fixed delusions were elicited  Vehemently denied SI/HI, intent or plan upon direct inquiry at this time  Drinks 1-2 cups of coffee at times  Denied smoking cigarettes (quit 1 year ago)  Reported h/o drinking daily in the past during the period led up to SA  Drinks 3-4 drinks once ot twice a month  Denied other illicit substance use  Reported h/o depression in mother and maternal side  No known FH of suicide  Denied h/o physical or sexual abuse  Denied any history of eating disorder  Reported mild obsessive/compulsive sxs as checking lights and oven 3-4 times before leaving  The patient was educated about the eNovance  and Environmental Approach to the mental health  Also, was educated about the importance of sleep hygiene, mindfulness, meditation and breathing techniques, and recommended to use Mindfulness  application and White noise for insomnia  The patient was receptive to the education  Will continue individual therapy  Psycho-education regarding SSRI's including Lexapro indications, benefits, risks, side effects, and alternative options provided to the patient, and the importance of the compliance with psychiatric treatment reiterated  The patient verbalized understanding and agreed to the proposed regimen  Review Of Systems:  Pertinent items are noted in HPI; all others are negative; no recent changes in medications or health status reported     PHQ-9 Depression Screening    PHQ-9:   Frequency of the following problems over the past two weeks:      Little interest or pleasure in doing things: 2 - more than half the days  Feeling down, depressed, or hopeless: 1 - several days  Trouble falling or staying asleep, or sleeping too much: 1 - several days  Feeling tired or having little energy: 1 - several days  Poor appetite or overeatin - not at all  Feeling bad about yourself - or that you are a failure or have let yourself or your family down: 2 - more than half the days  Trouble concentrating on things, such as reading the newspaper or watching television: 0 - not at all  Moving or speaking so slowly that other people could have noticed  Or the opposite - being so fidgety or restless that you have been moving around a lot more than usual: 0 - not at all  Thoughts that you would be better off dead, or of hurting yourself in some way: 0 - not at all  PHQ-2 Score: 3  PHQ-9 Score: 7         DEBORA-7 Flowsheet Screening      Most Recent Value   Over the last 2 weeks, how often have you been bothered by any of the following problems?    Feeling nervous, anxious, or on edge  0   Not being able to stop or control worrying  3   Worrying too much about different things  3   Trouble relaxing  2   Being so restless that it is hard to sit still  0   Becoming easily annoyed or irritable  1   Feeling afraid as if something awful might happen  2   DEBORA-7 Total Score  11            Past Psychiatric History:     Past Inpatient Psychiatric Treatment:   No history of past inpatient psychiatric admissions  Past Outpatient Psychiatric Treatment:    Not in outpatient treatment recently  Has a therapist  Past Suicide Attempts: yes, by binge drinking and cutting  Past Violent Behavior: no  Past Psychiatric Medication Trials: none    Traumatic History:     Abuse: no history of physical or sexual abuse  Other Traumatic Events: none     Family Psychiatric History:     Family History   Problem Relation Age of Onset    Psoriasis Father     Diabetes Paternal Grandfather     Heart disease Paternal Grandfather        Substance Use History:    Social History     Substance and Sexual Activity   Alcohol Use Yes     Social History     Substance and Sexual Activity   Drug Use No       Social History:  Developmental:  Education: college graduate  Marital history: single  Children: no  Living arrangement, social support: domiciled with a roommate  Occupational History: employed at [employer] to firearms: denied    Social History     Socioeconomic History    Marital status: Single     Spouse name: Not on file    Number of children: Not on file    Years of education: Not on file    Highest education level: Not on file   Occupational History    Not on file   Tobacco Use    Smoking status: Former Smoker     Types: Cigarettes     Quit date: 2020     Years since quittin 4    Smokeless tobacco: Never Used   Vaping Use    Vaping Use: Never used   Substance and Sexual Activity    Alcohol use: Yes    Drug use: No    Sexual activity: Yes     Partners: Female   Other Topics Concern    Not on file   Social History Narrative    Not on file     Social Determinants of Health     Financial Resource Strain:     Difficulty of Paying Living Expenses:    Food Insecurity:     Worried About Running Out of Food in the Last Year:     [address] in the Last Year:    Transportation Needs:     Lack of Transportation (Medical):      Lack of Transportation (Non-Medical):    Physical Activity:     Days of Exercise per Week:     Minutes of Exercise per Session:    Stress:     Feeling of Stress :    Social Connections:     Frequency of Communication with Friends and Family:     Frequency of Social Gatherings with Friends and Family:     Attends Voodoo Services:     Active Member of Clubs or Organizations:     Attends Club or Organization Meetings:     Marital Status:    Intimate Partner Violence:     Fear of Current or Ex-Partner:     Emotionally Abused:     Physically Abused:     Sexually Abused:        Past Medical History:    Past Medical History:   Diagnosis Date    Childhood asthma     Psoriasis     last assessed 9/4/14     No past medical history pertinent negatives  Past Surgical History:   Procedure Laterality Date    HERNIA REPAIR       No Known Allergies    History Review: The following portions of the patient's history were reviewed and updated as appropriate: allergies, current medications, past family history, past medical history, past social history, past surgical history and problem list     OBJECTIVE:    Vital signs in last 24 hours:    Vitals:    09/03/21 1356   Weight: 83 5 kg (184 lb)   Height: 5' 7 72" (1 72 m)       Mental Status Evaluation:  Appearance and attitude: appeared as stated age, cooperative and attentive, casually dressed with good hygiene, long hair, bearded, wearing a facemask and eye-glasses  Eye contact: good  Motor Function: within normal limits, intact gait, No PMA/PMR  Gait/station: normal gait/station and normal balance  Speech: normal for rate, rhythm, volume, latency, amount  Language: No overt abnormality  Mood/affect: anxious / Affect was constricted but reactive, mood congruent  Thought Processes: sequential and goal-directed  Thought content: denies suicidal ideation or homicidal ideation; no delusions or first rank symptoms  Associations: intact associations  Perceptual disturbances: denies Auditory/Visual/Tactile Hallucinations  Orientation: oriented to time, person, place and to the situational context  Cognitive Function: intact  Memory: intact short-term and long-term  Intellect: average  Fund of knowledge: aware of current events, aware of past history and vocabulary average  Impulse control: good  Insight/judgment: fair/fair    Pain: denied  Pain scale: 0    Lab Results: I have personally reviewed pertinent lab results          No results found for: WBC, HGB, HCT, MCV, PLT  No results found for: CREAT, BUN, SODIUM, CHLORIDE, CO2  No results found for: GGT, ALKPHOS  No results found for: CKMB  No results found for: TSH  No results found for: INR  No results found for: APTT  No results found for: PHENO  No results found for: LITHIUM, SODIUM, BUN, CREATININE, TSH, WBC  No components found for: B12  No results found for: FOLATE  No results found for: RPR    Imaging Studies:    No orders to display       EKG, Pathology, and Other Studies: N/A    Suicide/Homicide Risk Assessment:    Risk of Harm to Self:  The following ratings are based on assessment at the time of the interview  Demographic risk factors include: , never   Historical Risk Factors include: history of depression, history of anxiety, history of suicide attempt  Recent Specific Risk Factors include: current depressive symptoms, current anxiety symptoms  Protective Factors: no current suicidal ideation  Weapons: none  The following steps have been taken to ensure weapons are properly secured: not applicable  Based on today's assessment, Gilbert Snow presents the following risk of harm to self: chornically elevated risk; low at this time    Risk of Harm to Others: The following ratings are based on assessment at the time of the interview  Demographic Risk Factors include: male  Historical Risk Factors include: none  Recent Specific Risk Factors include: none  Protective Factors: no current homicidal ideation  Weapons: none  The following steps have been taken to ensure weapons are properly secured: not applicable  Based on today's assessment, Gilbert Snow presents the following risk of harm to others: low    The following interventions are recommended: contracts for safety at present - agrees to go to ED if feeling unsafe, contracts for safety at present - agrees to call Crisis Intervention Service if feeling unsafe, will continue individual therapy    Assessment/Plan:   In summary, the patient is a 27 y o    male, single, domiciled w/ a roommate, employed at [employer] w/ [employer] of psoriasis and bronchitis and PPH of anxiety, no prior psychiatric admissions, one prior SA (by drinking alcohol and cutting in 2016), no h/o self-injurious behavior, in individual therapy since 2020 by Marlon Alfred who presented to the mental health clinic for the initial intake and psychiatric evaluation on 9/3/2021 as referred by his therapist  Presented w/ depressive sxs in the past, and anxiety sxs, worrying excessively and over-thinking started in high school  Also reported OCD sxs as checking lock, stove, lights etc several times  Denied SI/HI, intent or plan upon direct inquiry at this time  DEBORA-7: 11; PHQ-9: 7  His current presentation meets criteria for MDD, DEBORA and OCD  Currently he is not at risk for suicide, homicide, self-injury, aggressive behaviors, self-neglect, or neglect of dependents or children  Given this presentation, the patient will benefit from further outpatient follow up for management of his symptoms  Started on Lexapro 5 mg for 6 days and then 10 mg po daily and Melatonin 1 mg 2h before bedtime for sleep; labs ordered  Will continue individual therapy     Diagnoses and all orders for this visit:    DEBORA (generalized anxiety disorder)  -     escitalopram (LEXAPRO) 10 mg tablet; Take 1 tablet (10 mg total) by mouth daily Take 1/2 tablet daily for 6 days and then continue 1 tablet daily    Moderate episode of recurrent major depressive disorder (HCC)  -     escitalopram (LEXAPRO) 10 mg tablet; Take 1 tablet (10 mg total) by mouth daily Take 1/2 tablet daily for 6 days and then continue 1 tablet daily    Screening for endocrine, nutritional, metabolic and immunity disorder  -     CBC and differential; Future  -     Comprehensive metabolic panel; Future  -     TSH, 3rd generation with T4 reflex; Future  -     Lipid panel; Future  -     Vitamin D 25 hydroxy; Future    Mixed obsessional thoughts and acts  -     escitalopram (LEXAPRO) 10 mg tablet;  Take 1 tablet (10 mg total) by mouth daily Take 1/2 tablet daily for 6 days and then continue 1 tablet daily    Other orders  -     clobetasol (TEMOVATE) 0 05 % ointment; APPLY TWICE A DAY AS NEEDED PSORIASIS            TREATMENT AND RECOMMENDATIONS:  - cbc dif, CMP, LFT, TSH, vit D level  - Start Lexapro 5 mg po daily for 6 days and then 10 mg po daily for depression, anxiety and OCD; dose to be adjusted as indicated  - Start Melatonin 1 mg po nightly 2h before bedtime for insomnia  - Consider vit D supplements    - Psychoeducation regarding medication benefits and risks, side effects, indications  and alternatives provided to the patient and the importance of compliance with psychiatric medication reiterated  The pt verbalized understanding and agreed with the plan  - Patient will continue individual psychotherapy   - RTC in 4 weeks  - The patient was educated about 24 hour and weekend coverage for urgent situations accessed by calling [facility] main practice number  - Patient was educated to call [address] [place] [address] ([phone]]) for behavioral crisis at anytime or 911 for any safety concerns, or go to nearest ER if his symptoms become overwhelming or unmanageable  Medications Risks/Benefits:      Risks, Benefits And Possible Side Effects Of Medications:    Risks, benefits, and possible side effects of medications explained to Elif Cheung and he verbalizes understanding and agreement for treatment      Controlled Medication Discussion:     Not applicable    Treatment Plan:    Completed and signed during the session: Yes - with Poornima Shipley MD 09/03/21

## 2021-09-03 ENCOUNTER — OFFICE VISIT (OUTPATIENT)
Dept: PSYCHIATRY | Facility: CLINIC | Age: 31
End: 2021-09-03
Payer: COMMERCIAL

## 2021-09-03 VITALS — HEIGHT: 68 IN | WEIGHT: 184 LBS | BODY MASS INDEX: 27.89 KG/M2

## 2021-09-03 DIAGNOSIS — Z13.21 SCREENING FOR ENDOCRINE, NUTRITIONAL, METABOLIC AND IMMUNITY DISORDER: ICD-10-CM

## 2021-09-03 DIAGNOSIS — F33.1 MODERATE EPISODE OF RECURRENT MAJOR DEPRESSIVE DISORDER (HCC): ICD-10-CM

## 2021-09-03 DIAGNOSIS — F42.2 MIXED OBSESSIONAL THOUGHTS AND ACTS: ICD-10-CM

## 2021-09-03 DIAGNOSIS — F41.1 GAD (GENERALIZED ANXIETY DISORDER): Primary | ICD-10-CM

## 2021-09-03 DIAGNOSIS — Z13.228 SCREENING FOR ENDOCRINE, NUTRITIONAL, METABOLIC AND IMMUNITY DISORDER: ICD-10-CM

## 2021-09-03 DIAGNOSIS — Z13.29 SCREENING FOR ENDOCRINE, NUTRITIONAL, METABOLIC AND IMMUNITY DISORDER: ICD-10-CM

## 2021-09-03 DIAGNOSIS — Z13.0 SCREENING FOR ENDOCRINE, NUTRITIONAL, METABOLIC AND IMMUNITY DISORDER: ICD-10-CM

## 2021-09-03 PROCEDURE — 96127 BRIEF EMOTIONAL/BEHAV ASSMT: CPT | Performed by: STUDENT IN AN ORGANIZED HEALTH CARE EDUCATION/TRAINING PROGRAM

## 2021-09-03 PROCEDURE — 90792 PSYCH DIAG EVAL W/MED SRVCS: CPT | Performed by: STUDENT IN AN ORGANIZED HEALTH CARE EDUCATION/TRAINING PROGRAM

## 2021-09-03 RX ORDER — CLOBETASOL PROPIONATE 0.5 MG/G
OINTMENT TOPICAL
COMMUNITY
Start: 2021-08-13

## 2021-09-03 RX ORDER — ESCITALOPRAM OXALATE 10 MG/1
10 TABLET ORAL DAILY
Qty: 30 TABLET | Refills: 0 | Status: SHIPPED | OUTPATIENT
Start: 2021-09-03 | End: 2021-09-27

## 2021-09-03 NOTE — BH TREATMENT PLAN
Treatment Plan done but not signed at time of office visit due to:  Plan reviewed with the patient during the virtual visit / in person and verbal consent given due to [address] distancing  TREATMENT PLAN (Medication Management Only)        Jeremy LOVE    Name and Date of Birth:  Estell Boast Friday 27 y o  1990  Date of Treatment Plan: September 3, 2021  Diagnosis/Diagnoses:    1  DEBORA (generalized anxiety disorder)    2  Moderate episode of recurrent major depressive disorder ([address] )    3  Screening for endocrine, nutritional, metabolic and immunity disorder    4  Mixed obsessional thoughts and acts      Strengths/Personal Resources for Self-Care: "supportive family, girlfriend, access to therapy and his hobbies"  Area/Areas of need (in own words): "depression, anxiety and OCD"  1  Long Term Goal: improve depression, anxiety and OCD  Target Date:4 months - 1/3/2022  Person/Persons responsible for completion of goal: Estell Boast  2  Short Term Objective (s) - How will we reach this goal?:   A  Provider new recommended medication/dosage changes and/or continue medication(s): continue current medications as prescribed  B  continue individual therapy  C  N/A  Target Date:4 months - 1/3/2022  Person/Persons Responsible for Completion of Goal: Estell Boast  Progress Towards Goals: starting treatment  Treatment Modality: medication management every 4 months, continue psychotherapy with own therapist  Review due 180 days from date of this plan: 4 months - 1/3/2022  Expected length of service: maintenance  My Physician/PA/NP and I have developed this plan together and I agree to work on the goals and objectives  I understand the treatment goals that were developed for my treatment

## 2021-09-26 DIAGNOSIS — F42.2 MIXED OBSESSIONAL THOUGHTS AND ACTS: ICD-10-CM

## 2021-09-26 DIAGNOSIS — F41.1 GAD (GENERALIZED ANXIETY DISORDER): ICD-10-CM

## 2021-09-26 DIAGNOSIS — F33.1 MODERATE EPISODE OF RECURRENT MAJOR DEPRESSIVE DISORDER (HCC): ICD-10-CM

## 2021-09-27 RX ORDER — ESCITALOPRAM OXALATE 10 MG/1
TABLET ORAL
Qty: 30 TABLET | Refills: 0 | Status: SHIPPED | OUTPATIENT
Start: 2021-09-27 | End: 2021-10-01 | Stop reason: SDUPTHER

## 2021-09-29 ENCOUNTER — OFFICE VISIT (OUTPATIENT)
Dept: URGENT CARE | Facility: CLINIC | Age: 31
End: 2021-09-29
Payer: COMMERCIAL

## 2021-09-29 VITALS
OXYGEN SATURATION: 97 % | DIASTOLIC BLOOD PRESSURE: 74 MMHG | SYSTOLIC BLOOD PRESSURE: 128 MMHG | RESPIRATION RATE: 18 BRPM | TEMPERATURE: 98.7 F | HEART RATE: 79 BPM

## 2021-09-29 DIAGNOSIS — R19.09 RIGHT GROIN MASS: Primary | ICD-10-CM

## 2021-09-29 PROCEDURE — 99213 OFFICE O/P EST LOW 20 MIN: CPT | Performed by: PHYSICIAN ASSISTANT

## 2021-09-29 NOTE — PROGRESS NOTES
[facility]        NAME: Isabella Sweeney Friday is a 27 y o  male  : 1990    MRN: [id number]  DATE: 2021  TIME: 2:39 PM    Assessment and Plan   Right groin mass [R19 09]  1  Right groin mass     Apt presents with right groin mass consistent with swollen lymph node  Recommend watchful waiting and follow-up with PCP next week if not improved  We did discuss possibility of groin strain or hernia as the cause as well  OTC analgesics as needed for pain  Heat and ice 20 minutes on and 20 minutes off as needed  He is instructed that if symptoms worsen, mass enlarges, or skin over the mass becomes warm or red he should report to the ED  Patient Instructions     Patient Instructions     Continue over the counter ibuprofen or tylenol as needed  Heat 20 minutes followed immediately by ice for 20 minutes as needed 2-3 times daily  Monitor carefully for any changes  If lump size increases, the overlying skin becomes warm, or pain suddenly increases report to the ED  Follow-up with PCP in 3-5 days if symptoms are not improved  Inguinal Hernia   AMBULATORY CARE:   An inguinal hernia  happens when organs or abdominal tissue push through a weak spot in the abdominal wall  The abdominal wall is made of fat and muscle  It holds the intestines in place  The hernia may contain fluid, tissue from the abdomen, or part of an organ (such as an intestine)  Common signs and symptoms:  A hernia may happen over time or it may happen suddenly  Some movements can make symptoms worse  Examples include when you cough, sneeze, strain to have a bowel movement, lift, or stand for a long time  You may have any of the following:  · A soft lump or bulge in your groin, lower abdomen, or scrotum     · Pain or burning in your abdomen    Seek care immediately if:   · You have severe abdominal pain with nausea and vomiting  · Your abdomen is larger than usual      · Your hernia gets bigger or is purple or blue  · You see blood in your bowel movements  · You feel weak, dizzy, or faint  Contact your healthcare provider if:   · You have a fever  · You have questions or concerns about your condition or care  Treatment for an inguinal hernia  usually involves surgery  Surgery can be done to place the hernia back inside the abdominal wall  Before you have surgery, you may be given medicine or have a manual reduction  Manual reduction means your healthcare provider will use his hands to put firm, steady pressure on your hernia  He will continue until the hernia disappears inside the abdominal wall  You may  need the following:  · NSAIDs , such as ibuprofen, help decrease swelling, pain, and fever  NSAIDs can cause stomach bleeding or kidney problems in certain people  If you take blood thinner medicine, always ask your healthcare provider if NSAIDs are safe for you  Always read the medicine label and follow directions  · Take your medicine as directed  Contact your healthcare provider if you think your medicine is not helping or if you have side effects  Tell him or her if you are allergic to any medicine  Keep a list of the medicines, vitamins, and herbs you take  Include the amounts, and when and why you take them  Bring the list or the pill bottles to follow-up visits  Carry your medicine list with you in case of an emergency  Follow up with your healthcare provider as directed:  Write down your questions so you remember to ask them during your visits  Manage your symptoms and prevent another hernia:   · Do not lift anything heavy  Heavy lifting can make your hernia worse or cause another hernia  Ask your healthcare provider how much is safe for you to lift  · Drink liquids as directed  Liquids may prevent constipation and straining during a bowel movement  Ask how much liquid to drink each day and which liquids are best for you  · Eat foods high in fiber    Fiber may prevent constipation and straining during a bowel movement  Foods that contain fiber include fruits, vegetables, beans, lentils, and whole grains  · Maintain a healthy weight  If you are overweight, weight loss may prevent your hernia from getting worse  It may also prevent another hernia  Talk to your healthcare provider about exercise and how to lose weight safely if you are overweight  · Do not smoke  Nicotine and other chemicals in cigarettes and cigars can weaken the abdominal wall  This may increase your risk for another hernia  Ask your healthcare provider for information if you currently smoke and need help to quit  E-cigarettes or smokeless tobacco still contain nicotine  Talk to your healthcare provider before you use these products  © Copyright [employer] 2021 Information is for End User's use only and may not be sold, redistributed or otherwise used for commercial purposes  All illustrations and images included in CareNotes® are the copyrighted property of [employer] M [employer] Inc  or Valerie Lozada   The above information is an  only  It is not intended as medical advice for individual conditions or treatments  Talk to your doctor, nurse or pharmacist before following any medical regimen to see if it is safe and effective for you  Groin Strain   AMBULATORY CARE:   A groin strain  happens when a muscle or tendon is stretched or torn  Tendons are cords of tissue that attach muscle to bone  Common symptoms include the following:   · Pain and tenderness in the inside of your thigh  · Pain when you bring your legs together  · Pain when you lift your knee  Call your doctor if:   · You have increased swelling and pain in your injured area  · You have increased groin pain when standing or with movement  · You have questions or concerns about your injury or treatment  Treatment for a groin strain  may include pain medicine   You may also need a support device, such as crutches or a cane, to decrease pain as you move around  Care for your groin strain:   · Rest to help decrease the risk of more damage to your groin  Avoid activities that cause you pain  Use crutches or a cane as directed  · Apply ice to help decrease swelling and pain  Use an ice pack, or put crushed ice in a plastic bag  Cover it with a towel before you put it on your groin  Apply ice for 15 to 20 minutes every hour, or as directed  · Wrap your groin  Your healthcare provider will instruct you on how to wrap your groin with an elastic bandage or tape  When you wrap your groin, it becomes more stable  Wrapping your groin can help decrease your pain  · Elevate the leg on your injured side as often as you can  This will help decrease swelling and pain in your groin  Prop your leg on pillows or blankets to keep it elevated comfortably  Activity:  You may need to exercise the injured area after your pain and swelling have decreased  Exercises will help prevent stiffness in the injured area and increase strength  Return to your normal activities slowly  You could injure yourself again if you try to return to normal activity too soon  Return to your normal level of activity when:  · You do not have pain when you walk, run, or jump  · Your injured leg moves like your other leg  · Your injured leg feels as strong as your other leg  Prevent another groin strain:   · Warm up and stretch before you exercise  · Wear shoes that fit well  · Wear equipment to protect yourself when you play sports  · Do exercises as directed to build muscle strength  Follow up with your doctor as directed:  Write down your questions so you remember to ask them during your visits  © Copyright [employer] 2021 Information is for End User's use only and may not be sold, redistributed or otherwise used for commercial purposes   All illustrations and images included in CareNotes® are the copyrighted property of [employer]  or [address]   The above information is an  only  It is not intended as medical advice for individual conditions or treatments  Talk to your doctor, nurse or pharmacist before following any medical regimen to see if it is safe and effective for you  Adenitis   AMBULATORY CARE:   Adenitis  is a condition that causes your lymph nodes to become swollen and tender  You may also have a fever  Adenitis is a sign of infection usually caused by bacteria  Call your local emergency number (911 in the [address]) if:   · You have trouble breathing or swallowing  Seek care immediately if:   · You have new or worsening redness or swelling  · You develop a large, soft bump that may leak pus  Call your doctor if:   · Your symptoms do not improve after 10 days of treatment  · You have questions or concerns about your condition or care  Treatment  may include any of the following:  · Antibiotics  help treat a bacterial infection  · Acetaminophen  decreases pain and fever  It is available without a doctor's order  Ask how much to take and how often to take it  Follow directions  Read the labels of all other medicines you are using to see if they also contain acetaminophen, or ask your doctor or pharmacist  Acetaminophen can cause liver damage if not taken correctly  Do not use more than 4 grams (4,000 milligrams) total of acetaminophen in one day  · NSAIDs , such as ibuprofen, help decrease swelling, pain, and fever  NSAIDs can cause stomach bleeding or kidney problems in certain people  If you take blood thinner medicine, always ask your healthcare provider if NSAIDs are safe for you  Always read the medicine label and follow directions  Manage your symptoms:   · Apply moist heat  on your swollen lymph nodes for 20 to 30 minutes every 2 hours or as directed  Heat helps decrease pain and swelling  You can make a moist heat pack by soaking a small towel in hot water   Let it cool until you can hold it with your bare hands  Then wring out the extra water  Place the towel in a plastic bag, and wrap the bag with a dry towel around the bag  Place the pack over your swollen lymph nodes  · Elevate your head and upper back  Keep your head and upper back elevated when you rest, such as in a recliner  Place extra pillows under your head and neck when you sleep in bed  Elevation helps decrease swelling  Prevent an infection:       · Wash your hands often  Wash your hands several times each day  Wash after you use the bathroom, change a child's diaper, and before you prepare or eat food  Use soap and water every time  Rub your soapy hands together, lacing your fingers  Wash the front and back of your hands, and in between your fingers  Use the fingers of one hand to scrub under the fingernails of the other hand  Wash for at least 20 seconds  Rinse with warm, running water for several seconds  Then dry your hands with a clean towel or paper towel  Use hand  that contains alcohol if soap and water are not available  Do not touch your eyes, nose, or mouth without washing your hands first          · Cover a sneeze or cough  Use a tissue that covers your mouth and nose  Throw the tissue away in a trash can right away  Use the bend of your arm if a tissue is not available  Wash your hands well with soap and water or use a hand   · Clean surfaces often  Clean doorknobs, countertops, cell phones, and other surfaces that are touched often  Use a disinfecting wipe, a single-use sponge, or a cloth you can wash and reuse  Use disinfecting  if you do not have wipes  You can create a disinfecting  by mixing 1 part bleach with 10 parts water  · Ask about vaccines you may need  Vaccines help prevent diseases caused by some viruses and bacteria  Get the influenza (flu) vaccine as soon as recommended each year   The flu vaccine is usually available starting in September or October  Flu viruses change, so it is important to get a flu vaccine every year  Get the pneumonia vaccine if recommended  This vaccine is usually recommended every 5 years  Your provider will tell you when to get this vaccine, if needed  He or she can tell you if you should get other vaccines, and when to get them  Follow up with your doctor within 2 days: You may be referred to a dentist or need more tests  Write down your questions so you remember to ask them during your visits  © Copyright [employer] 2021 Information is for End User's use only and may not be sold, redistributed or otherwise used for commercial purposes  All illustrations and images included in CareNotes® are the copyrighted property of [employer] M [employer] Inc  or Valerie Lozada   The above information is an  only  It is not intended as medical advice for individual conditions or treatments  Talk to your doctor, nurse or pharmacist before following any medical regimen to see if it is safe and effective for you  Follow up with PCP in 3-5 days  Proceed to  ER if symptoms worsen  Chief Complaint     Chief Complaint   Patient presents with    Groin Pain     Patient states that for 1-1 5 weeks he has had a lump to the right groin         History of Present Illness       27year old male presents with complaint of tender lump in the rigt side of his pelvis for 1-1 5 weeks duration  Pt reports it is causing tenderness along his inner thigh and radiates over the to the left side of the pelvis as well  Pain varies from a 3/10-5/10 depending on activity  He has never had anything like this before  Pt did attempt to reach his PCP, but they are unable to see him until next week  Symptoms are worse with ambulation, he has taken Ibuprofen with some relief  Pt denies penile pain, scrotal pain, testicular pain, penile discharge, changes in sexual partners, rashes, headaches, lightheadedness, myalgias, and arthralgias   No other concerns or complaints today  Review of Systems   Review of Systems   Constitutional: Negative for chills and fever  Gastrointestinal: Positive for abdominal pain  Genitourinary: Negative for discharge, penile pain, penile swelling, scrotal swelling and testicular pain  Musculoskeletal: Negative for arthralgias and myalgias  Neurological: Negative for dizziness, light-headedness and headaches  Current Medications       Current Outpatient Medications:     clobetasol (TEMOVATE) 0 05 % ointment, APPLY TWICE A DAY AS NEEDED PSORIASIS, Disp: , Rfl:     escitalopram (LEXAPRO) 10 mg tablet, TAKE 1/2 TABLET BY MOUTH DAILY FOR 6 DAYS AND THEN 1 TABLET DAILY, Disp: 30 tablet, Rfl: 0    HUMIRA PEN 40 MG/0 8ML PNKT, , Disp: , Rfl:     budesonide-formoterol (SYMBICORT) 160-4 5 mcg/act inhaler, Inhale 2 puffs 2 (two) times a day (Patient not taking: Reported on 2/20/2020), Disp: 1 Inhaler, Rfl: 0    montelukast (SINGULAIR) 10 mg tablet, Take 1 tablet (10 mg total) by mouth daily at bedtime for 30 days (Patient not taking: Reported on 2/20/2020), Disp: 30 tablet, Rfl: 0    Current Allergies     Allergies as of 09/29/2021    (No Known Allergies)            The following portions of the patient's history were reviewed and updated as appropriate: allergies, current medications, past family history, past medical history, past social history, past surgical history and problem list      Past Medical History:   Diagnosis Date    Childhood asthma     Psoriasis     last assessed 9/4/14       Past Surgical History:   Procedure Laterality Date    HERNIA REPAIR         Family History   Problem Relation Age of Onset    Psoriasis Father     Diabetes Paternal Grandfather     Heart disease Paternal Grandfather          Medications have been verified  Objective   /74   Pulse 79   Temp 98 7 °F (37 1 °C) (Tympanic)   Resp 18   SpO2 97%   No LMP for male patient         Physical Exam     Physical Exam  Vitals and nursing note reviewed  Exam conducted with a chaperone present (RN)  Constitutional:       General: He is awake  He is not in acute distress  Appearance: Normal appearance  He is well-developed and well-groomed  He is not ill-appearing, toxic-appearing or diaphoretic  HENT:      Head: Normocephalic and atraumatic  Right Ear: Hearing and external ear normal       Left Ear: Hearing and external ear normal    Eyes:      General: Lids are normal  Vision grossly intact  Gaze aligned appropriately  Cardiovascular:      Rate and Rhythm: Normal rate  Pulmonary:      Effort: Pulmonary effort is normal       Comments: Patient is speaking in full sentences with no increased respiratory effort  No audible wheezing or stridor  Abdominal:      Hernia: There is no hernia in the left inguinal area or right inguinal area  Genitourinary:     Pubic Area: No rash or pubic lice  Penis: No phimosis, swelling or lesions  Comments: Skin overlying mass is not warm to touch or erythematous  Mass is firm, but not indurated and there is no fluctuance  Musculoskeletal:      Cervical back: Normal range of motion  Right hip: Normal       Left hip: Normal    Lymphadenopathy:      Lower Body: Right inguinal adenopathy present  No left inguinal adenopathy  Skin:     General: Skin is warm and dry  Neurological:      Mental Status: He is alert and oriented to person, place, and time  Coordination: Coordination is intact  Gait: Gait is intact  Psychiatric:         Attention and Perception: Attention and perception normal          Mood and Affect: Mood and affect normal          Speech: Speech normal          Behavior: Behavior normal  Behavior is cooperative  Note: Portions of this record may have been created with voice recognition software   Occasional wrong word or "sound a like" substitutions may have occurred due to the inherent limitations of voice recognition software  Please read the chart carefully and recognize, using context, where substitutions have occurred  *

## 2021-09-29 NOTE — PATIENT INSTRUCTIONS
Continue over the counter ibuprofen or tylenol as needed  Heat 20 minutes followed immediately by ice for 20 minutes as needed 2-3 times daily  Monitor carefully for any changes  If lump size increases, the overlying skin becomes warm, or pain suddenly increases report to the ED  Follow-up with PCP in 3-5 days if symptoms are not improved  Inguinal Hernia   AMBULATORY CARE:   An inguinal hernia  happens when organs or abdominal tissue push through a weak spot in the abdominal wall  The abdominal wall is made of fat and muscle  It holds the intestines in place  The hernia may contain fluid, tissue from the abdomen, or part of an organ (such as an intestine)  Common signs and symptoms:  A hernia may happen over time or it may happen suddenly  Some movements can make symptoms worse  Examples include when you cough, sneeze, strain to have a bowel movement, lift, or stand for a long time  You may have any of the following:  · A soft lump or bulge in your groin, lower abdomen, or scrotum     · Pain or burning in your abdomen    Seek care immediately if:   · You have severe abdominal pain with nausea and vomiting  · Your abdomen is larger than usual      · Your hernia gets bigger or is purple or blue  · You see blood in your bowel movements  · You feel weak, dizzy, or faint  Contact your healthcare provider if:   · You have a fever  · You have questions or concerns about your condition or care  Treatment for an inguinal hernia  usually involves surgery  Surgery can be done to place the hernia back inside the abdominal wall  Before you have surgery, you may be given medicine or have a manual reduction  Manual reduction means your healthcare provider will use his hands to put firm, steady pressure on your hernia  He will continue until the hernia disappears inside the abdominal wall  You may  need the following:  · NSAIDs , such as ibuprofen, help decrease swelling, pain, and fever   NSAIDs can cause stomach bleeding or kidney problems in certain people  If you take blood thinner medicine, always ask your healthcare provider if NSAIDs are safe for you  Always read the medicine label and follow directions  · Take your medicine as directed  Contact your healthcare provider if you think your medicine is not helping or if you have side effects  Tell him or her if you are allergic to any medicine  Keep a list of the medicines, vitamins, and herbs you take  Include the amounts, and when and why you take them  Bring the list or the pill bottles to follow-up visits  Carry your medicine list with you in case of an emergency  Follow up with your healthcare provider as directed:  Write down your questions so you remember to ask them during your visits  Manage your symptoms and prevent another hernia:   · Do not lift anything heavy  Heavy lifting can make your hernia worse or cause another hernia  Ask your healthcare provider how much is safe for you to lift  · Drink liquids as directed  Liquids may prevent constipation and straining during a bowel movement  Ask how much liquid to drink each day and which liquids are best for you  · Eat foods high in fiber  Fiber may prevent constipation and straining during a bowel movement  Foods that contain fiber include fruits, vegetables, beans, lentils, and whole grains  · Maintain a healthy weight  If you are overweight, weight loss may prevent your hernia from getting worse  It may also prevent another hernia  Talk to your healthcare provider about exercise and how to lose weight safely if you are overweight  · Do not smoke  Nicotine and other chemicals in cigarettes and cigars can weaken the abdominal wall  This may increase your risk for another hernia  Ask your healthcare provider for information if you currently smoke and need help to quit  E-cigarettes or smokeless tobacco still contain nicotine   Talk to your healthcare provider before you use these products  © Copyright [employer] 2021 Information is for End User's use only and may not be sold, redistributed or otherwise used for commercial purposes  All illustrations and images included in CareNotes® are the copyrighted property of [employer] M [employer]  or Valerie Holt  The above information is an  only  It is not intended as medical advice for individual conditions or treatments  Talk to your doctor, nurse or pharmacist before following any medical regimen to see if it is safe and effective for you  Groin Strain   AMBULATORY CARE:   A groin strain  happens when a muscle or tendon is stretched or torn  Tendons are cords of tissue that attach muscle to bone  Common symptoms include the following:   · Pain and tenderness in the inside of your thigh  · Pain when you bring your legs together  · Pain when you lift your knee  Call your doctor if:   · You have increased swelling and pain in your injured area  · You have increased groin pain when standing or with movement  · You have questions or concerns about your injury or treatment  Treatment for a groin strain  may include pain medicine  You may also need a support device, such as crutches or a cane, to decrease pain as you move around  Care for your groin strain:   · Rest to help decrease the risk of more damage to your groin  Avoid activities that cause you pain  Use crutches or a cane as directed  · Apply ice to help decrease swelling and pain  Use an ice pack, or put crushed ice in a plastic bag  Cover it with a towel before you put it on your groin  Apply ice for 15 to 20 minutes every hour, or as directed  · Wrap your groin  Your healthcare provider will instruct you on how to wrap your groin with an elastic bandage or tape  When you wrap your groin, it becomes more stable  Wrapping your groin can help decrease your pain  · Elevate the leg on your injured side as often as you can  This will help decrease swelling and pain in your groin  Prop your leg on pillows or blankets to keep it elevated comfortably  Activity:  You may need to exercise the injured area after your pain and swelling have decreased  Exercises will help prevent stiffness in the injured area and increase strength  Return to your normal activities slowly  You could injure yourself again if you try to return to normal activity too soon  Return to your normal level of activity when:  · You do not have pain when you walk, run, or jump  · Your injured leg moves like your other leg  · Your injured leg feels as strong as your other leg  Prevent another groin strain:   · Warm up and stretch before you exercise  · Wear shoes that fit well  · Wear equipment to protect yourself when you play sports  · Do exercises as directed to build muscle strength  Follow up with your doctor as directed:  Write down your questions so you remember to ask them during your visits  © Copyright [employer] 2021 Information is for End User's use only and may not be sold, redistributed or otherwise used for commercial purposes  All illustrations and images included in CareNotes® are the copyrighted property of A [employer] M , Inc  or [address] [place], [place] [address]   The above information is an  only  It is not intended as medical advice for individual conditions or treatments  Talk to your doctor, nurse or pharmacist before following any medical regimen to see if it is safe and effective for you  Adenitis   AMBULATORY CARE:   Adenitis  is a condition that causes your lymph nodes to become swollen and tender  You may also have a fever  Adenitis is a sign of infection usually caused by bacteria  Call your local emergency number (911 in the [facility]) if:   · You have trouble breathing or swallowing  Seek care immediately if:   · You have new or worsening redness or swelling      · You develop a large, soft bump that may leak pus     Call your doctor if:   · Your symptoms do not improve after 10 days of treatment  · You have questions or concerns about your condition or care  Treatment  may include any of the following:  · Antibiotics  help treat a bacterial infection  · Acetaminophen  decreases pain and fever  It is available without a doctor's order  Ask how much to take and how often to take it  Follow directions  Read the labels of all other medicines you are using to see if they also contain acetaminophen, or ask your doctor or pharmacist  Acetaminophen can cause liver damage if not taken correctly  Do not use more than 4 grams (4,000 milligrams) total of acetaminophen in one day  · NSAIDs , such as ibuprofen, help decrease swelling, pain, and fever  NSAIDs can cause stomach bleeding or kidney problems in certain people  If you take blood thinner medicine, always ask your healthcare provider if NSAIDs are safe for you  Always read the medicine label and follow directions  Manage your symptoms:   · Apply moist heat  on your swollen lymph nodes for 20 to 30 minutes every 2 hours or as directed  Heat helps decrease pain and swelling  You can make a moist heat pack by soaking a small towel in hot water  Let it cool until you can hold it with your bare hands  Then wring out the extra water  Place the towel in a plastic bag, and wrap the bag with a dry towel around the bag  Place the pack over your swollen lymph nodes  · Elevate your head and upper back  Keep your head and upper back elevated when you rest, such as in a recliner  Place extra pillows under your head and neck when you sleep in bed  Elevation helps decrease swelling  Prevent an infection:       · Wash your hands often  Wash your hands several times each day  Wash after you use the bathroom, change a child's diaper, and before you prepare or eat food  Use soap and water every time  Rub your soapy hands together, lacing your fingers   Wash the front and back of your hands, and in between your fingers  Use the fingers of one hand to scrub under the fingernails of the other hand  Wash for at least 20 seconds  Rinse with warm, running water for several seconds  Then dry your hands with a clean towel or paper towel  Use hand  that contains alcohol if soap and water are not available  Do not touch your eyes, nose, or mouth without washing your hands first          · Cover a sneeze or cough  Use a tissue that covers your mouth and nose  Throw the tissue away in a trash can right away  Use the bend of your arm if a tissue is not available  Wash your hands well with soap and water or use a hand   · Clean surfaces often  Clean doorknobs, countertops, cell phones, and other surfaces that are touched often  Use a disinfecting wipe, a single-use sponge, or a cloth you can wash and reuse  Use disinfecting  if you do not have wipes  You can create a disinfecting  by mixing 1 part bleach with 10 parts water  · Ask about vaccines you may need  Vaccines help prevent diseases caused by some viruses and bacteria  Get the influenza (flu) vaccine as soon as recommended each year  The flu vaccine is usually available starting in September or October  Flu viruses change, so it is important to get a flu vaccine every year  Get the pneumonia vaccine if recommended  This vaccine is usually recommended every 5 years  Your provider will tell you when to get this vaccine, if needed  He or she can tell you if you should get other vaccines, and when to get them  Follow up with your doctor within 2 days: You may be referred to a dentist or need more tests  Write down your questions so you remember to ask them during your visits  © Copyright [employer] 2021 Information is for End User's use only and may not be sold, redistributed or otherwise used for commercial purposes   All illustrations and images included in CareNotes® are the copyrighted property of A D A M , Inc  or [facility] Lozada   The above information is an  only  It is not intended as medical advice for individual conditions or treatments  Talk to your doctor, nurse or pharmacist before following any medical regimen to see if it is safe and effective for you

## 2021-10-01 ENCOUNTER — TELEMEDICINE (OUTPATIENT)
Dept: PSYCHIATRY | Facility: CLINIC | Age: 31
End: 2021-10-01
Payer: COMMERCIAL

## 2021-10-01 DIAGNOSIS — F42.2 MIXED OBSESSIONAL THOUGHTS AND ACTS: ICD-10-CM

## 2021-10-01 DIAGNOSIS — F33.1 MODERATE EPISODE OF RECURRENT MAJOR DEPRESSIVE DISORDER (HCC): ICD-10-CM

## 2021-10-01 DIAGNOSIS — F41.1 GAD (GENERALIZED ANXIETY DISORDER): Primary | ICD-10-CM

## 2021-10-01 PROCEDURE — 90833 PSYTX W PT W E/M 30 MIN: CPT | Performed by: STUDENT IN AN ORGANIZED HEALTH CARE EDUCATION/TRAINING PROGRAM

## 2021-10-01 PROCEDURE — 3725F SCREEN DEPRESSION PERFORMED: CPT | Performed by: STUDENT IN AN ORGANIZED HEALTH CARE EDUCATION/TRAINING PROGRAM

## 2021-10-01 PROCEDURE — 99214 OFFICE O/P EST MOD 30 MIN: CPT | Performed by: STUDENT IN AN ORGANIZED HEALTH CARE EDUCATION/TRAINING PROGRAM

## 2021-10-01 RX ORDER — ESCITALOPRAM OXALATE 10 MG/1
10 TABLET ORAL DAILY
Qty: 90 TABLET | Refills: 1 | Status: SHIPPED | OUTPATIENT
Start: 2021-10-01 | End: 2022-02-04 | Stop reason: DRUGHIGH

## 2021-10-14 ENCOUNTER — OFFICE VISIT (OUTPATIENT)
Dept: FAMILY MEDICINE CLINIC | Facility: CLINIC | Age: 31
End: 2021-10-14
Payer: COMMERCIAL

## 2021-10-14 ENCOUNTER — TELEPHONE (OUTPATIENT)
Dept: FAMILY MEDICINE CLINIC | Facility: CLINIC | Age: 31
End: 2021-10-14

## 2021-10-14 VITALS
WEIGHT: 194.2 LBS | OXYGEN SATURATION: 98 % | HEART RATE: 89 BPM | HEIGHT: 68 IN | TEMPERATURE: 98.7 F | BODY MASS INDEX: 29.43 KG/M2 | SYSTOLIC BLOOD PRESSURE: 100 MMHG | DIASTOLIC BLOOD PRESSURE: 60 MMHG

## 2021-10-14 DIAGNOSIS — N45.1 EPIDIDYMITIS: Primary | ICD-10-CM

## 2021-10-14 DIAGNOSIS — N50.811 PAIN IN RIGHT TESTICLE: ICD-10-CM

## 2021-10-14 DIAGNOSIS — R59.9 ENLARGED LYMPH NODE: ICD-10-CM

## 2021-10-14 PROCEDURE — 87591 N.GONORRHOEAE DNA AMP PROB: CPT | Performed by: NURSE PRACTITIONER

## 2021-10-14 PROCEDURE — 1036F TOBACCO NON-USER: CPT | Performed by: NURSE PRACTITIONER

## 2021-10-14 PROCEDURE — 87491 CHLMYD TRACH DNA AMP PROBE: CPT | Performed by: NURSE PRACTITIONER

## 2021-10-14 PROCEDURE — 3008F BODY MASS INDEX DOCD: CPT | Performed by: NURSE PRACTITIONER

## 2021-10-14 PROCEDURE — 99213 OFFICE O/P EST LOW 20 MIN: CPT | Performed by: NURSE PRACTITIONER

## 2021-10-14 RX ORDER — OFLOXACIN 300 MG/1
300 TABLET, COATED ORAL 2 TIMES DAILY
Qty: 20 TABLET | Refills: 0 | Status: SHIPPED | OUTPATIENT
Start: 2021-10-14 | End: 2021-10-24

## 2021-10-16 LAB
C TRACH DNA SPEC QL NAA+PROBE: NEGATIVE
N GONORRHOEA DNA SPEC QL NAA+PROBE: NEGATIVE

## 2021-10-18 ENCOUNTER — TELEPHONE (OUTPATIENT)
Dept: FAMILY MEDICINE CLINIC | Facility: CLINIC | Age: 31
End: 2021-10-18

## 2021-10-19 ENCOUNTER — HOSPITAL ENCOUNTER (OUTPATIENT)
Dept: ULTRASOUND IMAGING | Facility: HOSPITAL | Age: 31
Discharge: HOME | End: 2021-10-19
Payer: COMMERCIAL

## 2021-10-19 DIAGNOSIS — N50.811 PAIN IN RIGHT TESTICLE: ICD-10-CM

## 2021-10-19 DIAGNOSIS — R59.9 ENLARGED LYMPH NODE: ICD-10-CM

## 2021-10-19 PROCEDURE — 76870 US EXAM SCROTUM: CPT

## 2021-10-19 PROCEDURE — 76705 ECHO EXAM OF ABDOMEN: CPT

## 2021-10-21 ENCOUNTER — TELEPHONE (OUTPATIENT)
Dept: FAMILY MEDICINE CLINIC | Facility: CLINIC | Age: 31
End: 2021-10-21

## 2021-10-23 ENCOUNTER — TELEPHONE (OUTPATIENT)
Dept: FAMILY MEDICINE CLINIC | Facility: CLINIC | Age: 31
End: 2021-10-23

## 2021-11-05 ENCOUNTER — TELEPHONE (OUTPATIENT)
Dept: FAMILY MEDICINE CLINIC | Facility: CLINIC | Age: 31
End: 2021-11-05

## 2021-11-08 ENCOUNTER — TELEPHONE (OUTPATIENT)
Dept: FAMILY MEDICINE CLINIC | Facility: CLINIC | Age: 31
End: 2021-11-08

## 2021-11-11 ENCOUNTER — TELEPHONE (OUTPATIENT)
Dept: FAMILY MEDICINE CLINIC | Facility: CLINIC | Age: 31
End: 2021-11-11

## 2021-12-28 ENCOUNTER — NURSE TRIAGE (OUTPATIENT)
Dept: OTHER | Facility: OTHER | Age: 31
End: 2021-12-28

## 2021-12-28 PROCEDURE — U0005 INFEC AGEN DETEC AMPLI PROBE: HCPCS | Performed by: FAMILY MEDICINE

## 2021-12-28 PROCEDURE — U0003 INFECTIOUS AGENT DETECTION BY NUCLEIC ACID (DNA OR RNA); SEVERE ACUTE RESPIRATORY SYNDROME CORONAVIRUS 2 (SARS-COV-2) (CORONAVIRUS DISEASE [COVID-19]), AMPLIFIED PROBE TECHNIQUE, MAKING USE OF HIGH THROUGHPUT TECHNOLOGIES AS DESCRIBED BY CMS-2020-01-R: HCPCS | Performed by: FAMILY MEDICINE
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
## 2022-02-04 ENCOUNTER — TELEMEDICINE (OUTPATIENT)
Dept: PSYCHIATRY | Facility: CLINIC | Age: 32
End: 2022-02-04
Payer: COMMERCIAL

## 2022-02-04 DIAGNOSIS — F41.1 GAD (GENERALIZED ANXIETY DISORDER): ICD-10-CM

## 2022-02-04 DIAGNOSIS — F33.1 MODERATE EPISODE OF RECURRENT MAJOR DEPRESSIVE DISORDER (HCC): ICD-10-CM

## 2022-02-04 DIAGNOSIS — F42.2 MIXED OBSESSIONAL THOUGHTS AND ACTS: Primary | ICD-10-CM

## 2022-02-04 PROCEDURE — 90833 PSYTX W PT W E/M 30 MIN: CPT | Performed by: STUDENT IN AN ORGANIZED HEALTH CARE EDUCATION/TRAINING PROGRAM

## 2022-02-04 PROCEDURE — 99214 OFFICE O/P EST MOD 30 MIN: CPT | Performed by: STUDENT IN AN ORGANIZED HEALTH CARE EDUCATION/TRAINING PROGRAM

## 2022-02-04 PROCEDURE — 1036F TOBACCO NON-USER: CPT | Performed by: STUDENT IN AN ORGANIZED HEALTH CARE EDUCATION/TRAINING PROGRAM

## 2022-02-04 RX ORDER — ESCITALOPRAM OXALATE 20 MG/1
20 TABLET ORAL DAILY
Qty: 90 TABLET | Refills: 0 | Status: SHIPPED | OUTPATIENT
Start: 2022-02-04 | End: 2022-04-29 | Stop reason: SDUPTHER

## 2022-02-04 NOTE — BH TREATMENT PLAN
TREATMENT PLAN (Medication Management Only)        [employer] [facility]    Name and Date of Birth:  Fredrick Litten Friday 27 y o  1990  Date of Treatment Plan: February 4, 2022  Diagnosis/Diagnoses:    1  DEBORA (generalized anxiety disorder)    2  Mixed obsessional thoughts and acts    3  Moderate episode of recurrent major depressive disorder [facility])      Strengths/Personal Resources for Self-Care: "supportive girlfriend, family and his hobbies - access to therapy"  Area/Areas of need (in own words): "anxiety, depression and OCD sxs"  1  Long Term Goal: maintained cotrol of depression and anxiety and improve OCD sxs  Target Date:6 months - 8/4/2022  Person/Persons responsible for completion of goal: Fredrick Litten  2  Short Term Objective (s) - How will we reach this goal?:   A  Provider new recommended medication/dosage changes and/or continue medication(s): continue current medications as prescribed  B  continue individual therapy  C  N/A  Target Date:6 months - 8/4/2022  Person/Persons Responsible for Completion of Goal: Fredrick Litten  Progress Towards Goals: continuing treatment  Treatment Modality: medication management every 6 month, continue psychotherapy with own therapist  Review due 180 days from date of this plan: 6 months - 8/4/2022  Expected length of service: maintenance  My Physician/PA/NP and I have developed this plan together and I agree to work on the goals and objectives  I understand the treatment goals that were developed for my treatment
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
## 2022-04-29 ENCOUNTER — TELEMEDICINE (OUTPATIENT)
Dept: PSYCHIATRY | Facility: CLINIC | Age: 32
End: 2022-04-29
Payer: COMMERCIAL

## 2022-04-29 DIAGNOSIS — F42.2 MIXED OBSESSIONAL THOUGHTS AND ACTS: Primary | ICD-10-CM

## 2022-04-29 DIAGNOSIS — F41.1 GAD (GENERALIZED ANXIETY DISORDER): ICD-10-CM

## 2022-04-29 DIAGNOSIS — F33.1 MODERATE EPISODE OF RECURRENT MAJOR DEPRESSIVE DISORDER (HCC): ICD-10-CM

## 2022-04-29 PROCEDURE — 90833 PSYTX W PT W E/M 30 MIN: CPT | Performed by: STUDENT IN AN ORGANIZED HEALTH CARE EDUCATION/TRAINING PROGRAM

## 2022-04-29 PROCEDURE — 1036F TOBACCO NON-USER: CPT | Performed by: STUDENT IN AN ORGANIZED HEALTH CARE EDUCATION/TRAINING PROGRAM

## 2022-04-29 PROCEDURE — 3725F SCREEN DEPRESSION PERFORMED: CPT | Performed by: STUDENT IN AN ORGANIZED HEALTH CARE EDUCATION/TRAINING PROGRAM

## 2022-04-29 PROCEDURE — 99214 OFFICE O/P EST MOD 30 MIN: CPT | Performed by: STUDENT IN AN ORGANIZED HEALTH CARE EDUCATION/TRAINING PROGRAM

## 2022-04-29 RX ORDER — ESCITALOPRAM OXALATE 20 MG/1
20 TABLET ORAL DAILY
Qty: 90 TABLET | Refills: 0 | Status: SHIPPED | OUTPATIENT
Start: 2022-04-29 | End: 2022-07-28

## 2022-08-19 DIAGNOSIS — F42.2 MIXED OBSESSIONAL THOUGHTS AND ACTS: ICD-10-CM

## 2022-08-19 DIAGNOSIS — F41.1 GAD (GENERALIZED ANXIETY DISORDER): ICD-10-CM

## 2022-08-19 DIAGNOSIS — F33.1 MODERATE EPISODE OF RECURRENT MAJOR DEPRESSIVE DISORDER (HCC): ICD-10-CM

## 2022-08-20 RX ORDER — ESCITALOPRAM OXALATE 20 MG/1
TABLET ORAL
Qty: 90 TABLET | Refills: 0 | Status: SHIPPED | OUTPATIENT
Start: 2022-08-20 | End: 2022-10-20 | Stop reason: SDUPTHER

## 2022-08-24 ENCOUNTER — OFFICE VISIT (OUTPATIENT)
Dept: FAMILY MEDICINE CLINIC | Facility: CLINIC | Age: 32
End: 2022-08-24
Payer: COMMERCIAL

## 2022-08-24 VITALS
HEIGHT: 68 IN | WEIGHT: 210 LBS | DIASTOLIC BLOOD PRESSURE: 70 MMHG | HEART RATE: 90 BPM | TEMPERATURE: 98.7 F | OXYGEN SATURATION: 97 % | SYSTOLIC BLOOD PRESSURE: 116 MMHG | BODY MASS INDEX: 31.83 KG/M2

## 2022-08-24 DIAGNOSIS — J40 BRONCHITIS: Primary | ICD-10-CM

## 2022-08-24 PROCEDURE — 99214 OFFICE O/P EST MOD 30 MIN: CPT | Performed by: FAMILY MEDICINE

## 2022-08-24 PROCEDURE — 3725F SCREEN DEPRESSION PERFORMED: CPT | Performed by: FAMILY MEDICINE

## 2022-08-24 RX ORDER — AZITHROMYCIN 250 MG/1
TABLET, FILM COATED ORAL
Qty: 6 TABLET | Refills: 0 | Status: SHIPPED | OUTPATIENT
Start: 2022-08-24 | End: 2022-08-29

## 2022-08-24 RX ORDER — METHYLPREDNISOLONE 4 MG/1
TABLET ORAL
Qty: 21 EACH | Refills: 0 | Status: SHIPPED | OUTPATIENT
Start: 2022-08-24 | End: 2022-09-07
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
## 2022-08-24 NOTE — PROGRESS NOTES
Assessment/Plan:    1  Bronchitis  Assessment & Plan:  Patient with persistent cough since COVID infection  He was instructed to start medrol dose pack and antibiotic  If symptoms do not improve with treatment, follow up in 2 weeks for further assessment  Orders:  -     methylPREDNISolone 4 MG tablet therapy pack; Use as directed on package  -     azithromycin (Zithromax) 250 mg tablet; Take 2 tablets (500 mg total) by mouth daily for 1 day, THEN 1 tablet (250 mg total) daily for 4 days  Subjective:      Patient ID: Sara López Friday is a 34 y o  male  HPI    Patient states that he had COVID infection 2 month ago on July 23rd  He went to [facility] to get tested  He was sick a few days prior to getting tested  He started to have body aches, chills, coughing and congestion  It took about a week to resolve  He treated his symptoms with over the counter medications  Most of his symptoms resolved, except for the cough is persistent  The cough is mostly dry and worse at night time  The following portions of the patient's history were reviewed and updated as appropriate: allergies, current medications, past family history, past medical history, past social history, past surgical history, and problem list       Current Outpatient Medications:     azithromycin (Zithromax) 250 mg tablet, Take 2 tablets (500 mg total) by mouth daily for 1 day, THEN 1 tablet (250 mg total) daily for 4 days  , Disp: 6 tablet, Rfl: 0    Cholecalciferol (VITAMIN D-3 PO), Take by mouth, Disp: , Rfl:     clobetasol (TEMOVATE) 0 05 % ointment, APPLY TWICE A DAY AS NEEDED PSORIASIS, Disp: , Rfl:     escitalopram (LEXAPRO) 20 mg tablet, TAKE 1 TABLET BY MOUTH EVERY DAY, Disp: 90 tablet, Rfl: 0    HUMIRA PEN 40 MG/0 8ML PNKT, , Disp: , Rfl:     methylPREDNISolone 4 MG tablet therapy pack, Use as directed on package, Disp: 21 each, Rfl: 0      Review of Systems   Constitutional: Negative for chills and fever     HENT: Negative for ear pain and sore throat  Eyes: Negative for pain and visual disturbance  Respiratory: Positive for cough  Negative for shortness of breath  Cardiovascular: Negative for chest pain and palpitations  Gastrointestinal: Negative for abdominal pain and vomiting  Genitourinary: Negative for dysuria and hematuria  Musculoskeletal: Negative for arthralgias and back pain  Skin: Negative for color change and rash  Neurological: Negative for seizures and syncope  All other systems reviewed and are negative  Objective:      /70 (BP Location: Left arm, Patient Position: Sitting, Cuff Size: Large)   Pulse 90   Temp 98 7 °F (37 1 °C) (Temporal)   Ht 5' 7 72" (1 72 m)   Wt 95 3 kg (210 lb)   SpO2 97%   BMI 32 19 kg/m²          Physical Exam  Vitals and nursing note reviewed  Constitutional:       General: He is not in acute distress  Appearance: Normal appearance  He is not toxic-appearing  HENT:      Head: Normocephalic and atraumatic  Nose: Nose normal  No congestion  Mouth/Throat:      Mouth: Mucous membranes are moist    Eyes:      Extraocular Movements: Extraocular movements intact  Conjunctiva/sclera: Conjunctivae normal       Pupils: Pupils are equal, round, and reactive to light  Cardiovascular:      Rate and Rhythm: Normal rate and regular rhythm  Heart sounds: Normal heart sounds  No murmur heard  Pulmonary:      Effort: Pulmonary effort is normal  No respiratory distress  Breath sounds: Normal breath sounds  No wheezing  Comments: +coughing    Musculoskeletal:      Cervical back: Normal range of motion  Skin:     General: Skin is warm  Neurological:      General: No focal deficit present  Mental Status: He is alert and oriented to person, place, and time  Mental status is at baseline  Psychiatric:         Mood and Affect: Mood normal          Behavior: Behavior normal          Thought Content:  Thought content normal  Judgment: Judgment normal

## 2022-08-25 ENCOUNTER — TELEMEDICINE (OUTPATIENT)
Dept: PSYCHIATRY | Facility: CLINIC | Age: 32
End: 2022-08-25
Payer: COMMERCIAL

## 2022-08-25 DIAGNOSIS — F42.2 MIXED OBSESSIONAL THOUGHTS AND ACTS: Primary | ICD-10-CM

## 2022-08-25 DIAGNOSIS — Z13.29 SCREENING FOR ENDOCRINE, NUTRITIONAL, METABOLIC AND IMMUNITY DISORDER: ICD-10-CM

## 2022-08-25 DIAGNOSIS — Z13.21 SCREENING FOR ENDOCRINE, NUTRITIONAL, METABOLIC AND IMMUNITY DISORDER: ICD-10-CM

## 2022-08-25 DIAGNOSIS — Z13.0 SCREENING FOR ENDOCRINE, NUTRITIONAL, METABOLIC AND IMMUNITY DISORDER: ICD-10-CM

## 2022-08-25 DIAGNOSIS — F33.1 MODERATE EPISODE OF RECURRENT MAJOR DEPRESSIVE DISORDER (HCC): ICD-10-CM

## 2022-08-25 DIAGNOSIS — Z13.228 SCREENING FOR ENDOCRINE, NUTRITIONAL, METABOLIC AND IMMUNITY DISORDER: ICD-10-CM

## 2022-08-25 DIAGNOSIS — F41.1 GAD (GENERALIZED ANXIETY DISORDER): ICD-10-CM

## 2022-08-25 PROCEDURE — 99214 OFFICE O/P EST MOD 30 MIN: CPT | Performed by: STUDENT IN AN ORGANIZED HEALTH CARE EDUCATION/TRAINING PROGRAM

## 2022-08-25 PROCEDURE — 90833 PSYTX W PT W E/M 30 MIN: CPT | Performed by: STUDENT IN AN ORGANIZED HEALTH CARE EDUCATION/TRAINING PROGRAM

## 2022-08-25 RX ORDER — ESCITALOPRAM OXALATE 10 MG/1
10 TABLET ORAL DAILY
Qty: 90 TABLET | Refills: 0 | Status: SHIPPED | OUTPATIENT
Start: 2022-08-25 | End: 2022-10-20 | Stop reason: SDUPTHER

## 2022-08-25 NOTE — ASSESSMENT & PLAN NOTE
Patient with persistent cough since COVID infection  He was instructed to start medrol dose pack and antibiotic  If symptoms do not improve with treatment, follow up in 2 weeks for further assessment

## 2022-08-25 NOTE — BH TREATMENT PLAN
Treatment Plan done but not signed at time of office visit due to:  Plan reviewed with the patient during the virtual visit and verbal consent given  TREATMENT PLAN (Medication Management Only)        Jeremy LOVE    Name and Date of Birth:  Babita Garcia Friday 34 y o  1990  Date of Treatment Plan: August 25, 2022  Diagnosis/Diagnoses:    1  Mixed obsessional thoughts and acts    2  Moderate episode of recurrent major depressive disorder ([address] )    3  DEBORA (generalized anxiety disorder)    4  Screening for endocrine, nutritional, metabolic and immunity disorder      Strengths/Personal Resources for Self-Care: "supprotive family, girldfriend, hobbies and access to therapy"  Area/Areas of need (in own words): "OCD sxs, anxiety and depression"  1  Long Term Goal: maintain control of anxiety and depression and continue to improve OCD sxs  Target Date:6 months - 2/25/2023  Person/Persons responsible for completion of goal: Babita Garcia  2  Short Term Objective (s) - How will we reach this goal?:   A  Provider new recommended medication/dosage changes and/or continue medication(s): continue current medications as prescribed  B  Attend psychotherapy regularly  C  N/A  Target Date:6 months - 2/25/2023  Person/Persons Responsible for Completion of Goal: Babita Garcia  Progress Towards Goals: continuing treatment  Treatment Modality: medication management every 6 months, continue psychotherapy with own therapist  Review due 180 days from date of this plan: 6 months - 2/25/2023  Expected length of service: maintenance  My Physician/PA/NP and I have developed this plan together and I agree to work on the goals and objectives  I understand the treatment goals that were developed for my treatment

## 2022-09-07 ENCOUNTER — OFFICE VISIT (OUTPATIENT)
Dept: FAMILY MEDICINE CLINIC | Facility: CLINIC | Age: 32
End: 2022-09-07
Payer: COMMERCIAL

## 2022-09-07 ENCOUNTER — APPOINTMENT (OUTPATIENT)
Dept: RADIOLOGY | Facility: CLINIC | Age: 32
End: 2022-09-07
Payer: COMMERCIAL

## 2022-09-07 VITALS
HEIGHT: 68 IN | TEMPERATURE: 97.8 F | HEART RATE: 88 BPM | SYSTOLIC BLOOD PRESSURE: 110 MMHG | BODY MASS INDEX: 31.98 KG/M2 | DIASTOLIC BLOOD PRESSURE: 76 MMHG | OXYGEN SATURATION: 98 % | WEIGHT: 211 LBS

## 2022-09-07 DIAGNOSIS — R05.3 POST-COVID CHRONIC COUGH: ICD-10-CM

## 2022-09-07 DIAGNOSIS — U09.9 POST-COVID CHRONIC COUGH: Primary | ICD-10-CM

## 2022-09-07 DIAGNOSIS — J40 BRONCHITIS: ICD-10-CM

## 2022-09-07 DIAGNOSIS — U09.9 POST-COVID CHRONIC COUGH: ICD-10-CM

## 2022-09-07 DIAGNOSIS — R05.3 POST-COVID CHRONIC COUGH: Primary | ICD-10-CM

## 2022-09-07 PROCEDURE — 71046 X-RAY EXAM CHEST 2 VIEWS: CPT

## 2022-09-07 PROCEDURE — 99214 OFFICE O/P EST MOD 30 MIN: CPT | Performed by: FAMILY MEDICINE

## 2022-09-07 NOTE — PROGRESS NOTES
Assessment/Plan:    1  Post-COVID chronic cough  Assessment & Plan:  Patient with post-COVID cough that has not improved with oral steroid and antibiotic treatment  We will obtain chest Xray and start patient on steroid inhaler for 3 months  Goal is to stop the inhaler after 3 months  If he continues to have coughing or any worsening symptoms, consider referral to pulmonologist at next visit  Follow up in 3 months  Orders:  -     XR chest pa & lateral; Future; Expected date: 09/07/2022  -     Fluticasone Propionate, Inhal, (Flovent Diskus) 100 MCG/BLIST AEPB; Inhale 2 puffs in the morning Rinse mouth after use  2  Bronchitis  -     XR chest pa & lateral; Future; Expected date: 09/07/2022  -     Fluticasone Propionate, Inhal, (Flovent Diskus) 100 MCG/BLIST AEPB; Inhale 2 puffs in the morning Rinse mouth after use  Subjective:      Patient ID: Jai Bueno Friday is a 34 y o  male  HPI    Patient presenting for follow up of cough post COVID infection  He did complete steroid and antibiotic treatment with no improvement in his cough  He states that the cough is worse at night  He has trouble falling asleep and he also wakes up in the middle of the night coughing  When he takes deep breaths it triggers a cough  He has history of childhood asthma  He has not smoked in 2 years  Patient denies chest pain, tightness or shortness of breath  The cough is dry  It also occurs sporadically during the day        The following portions of the patient's history were reviewed and updated as appropriate: allergies, current medications, past family history, past medical history, past social history, past surgical history, and problem list       Current Outpatient Medications:     Cholecalciferol (VITAMIN D-3 PO), Take by mouth, Disp: , Rfl:     clobetasol (TEMOVATE) 0 05 % ointment, APPLY TWICE A DAY AS NEEDED PSORIASIS, Disp: , Rfl:     escitalopram (Lexapro) 10 mg tablet, Take 1 tablet (10 mg total) by mouth daily Take 10 mg and 20 mg (total: 30 mg) daily, Disp: 90 tablet, Rfl: 0    escitalopram (LEXAPRO) 20 mg tablet, TAKE 1 TABLET BY MOUTH EVERY DAY, Disp: 90 tablet, Rfl: 0    Fluticasone Propionate, Inhal, (Flovent Diskus) 100 MCG/BLIST AEPB, Inhale 2 puffs in the morning Rinse mouth after use , Disp: 180 blister, Rfl: 0    HUMIRA PEN 40 MG/0 8ML PNKT, , Disp: , Rfl:       Review of Systems   Constitutional: Negative for chills and fever  HENT: Negative for ear pain and sore throat  Eyes: Negative for pain and visual disturbance  Respiratory: Positive for cough  Negative for apnea, chest tightness, shortness of breath and wheezing  Cardiovascular: Negative for chest pain and palpitations  Gastrointestinal: Negative for abdominal pain and vomiting  Genitourinary: Negative for dysuria and hematuria  Musculoskeletal: Negative for arthralgias and back pain  Skin: Negative for color change and rash  Neurological: Negative for seizures and syncope  All other systems reviewed and are negative  Objective:      /76 (BP Location: Left arm, Patient Position: Sitting, Cuff Size: Large)   Pulse 88   Temp 97 8 °F (36 6 °C) (Temporal)   Ht 5' 7 72" (1 72 m)   Wt 95 7 kg (211 lb)   SpO2 98%   BMI 32 35 kg/m²          Physical Exam  Vitals and nursing note reviewed  Constitutional:       General: He is not in acute distress  Appearance: Normal appearance  He is not toxic-appearing  HENT:      Head: Normocephalic and atraumatic  Mouth/Throat:      Mouth: Mucous membranes are moist       Pharynx: Oropharynx is clear  No oropharyngeal exudate or posterior oropharyngeal erythema  Eyes:      Extraocular Movements: Extraocular movements intact  Conjunctiva/sclera: Conjunctivae normal       Pupils: Pupils are equal, round, and reactive to light  Cardiovascular:      Rate and Rhythm: Normal rate and regular rhythm  Heart sounds: Normal heart sounds   No murmur heard   Pulmonary:      Effort: Pulmonary effort is normal  No respiratory distress  Breath sounds: Normal breath sounds  No wheezing  Comments: +cough with deep inhalation   Skin:     General: Skin is warm  Neurological:      General: No focal deficit present  Mental Status: He is alert and oriented to person, place, and time  Mental status is at baseline  Psychiatric:         Mood and Affect: Mood normal          Behavior: Behavior normal          Thought Content:  Thought content normal

## 2022-09-07 NOTE — ASSESSMENT & PLAN NOTE
Patient with post-COVID cough that has not improved with oral steroid and antibiotic treatment  We will obtain chest Xray and start patient on steroid inhaler for 3 months  Goal is to stop the inhaler after 3 months  If he continues to have coughing or any worsening symptoms, consider referral to pulmonologist at next visit  Follow up in 3 months
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
## 2022-10-20 ENCOUNTER — TELEMEDICINE (OUTPATIENT)
Dept: PSYCHIATRY | Facility: CLINIC | Age: 32
End: 2022-10-20
Payer: COMMERCIAL

## 2022-10-20 DIAGNOSIS — F41.1 GAD (GENERALIZED ANXIETY DISORDER): ICD-10-CM

## 2022-10-20 DIAGNOSIS — F42.2 MIXED OBSESSIONAL THOUGHTS AND ACTS: Primary | ICD-10-CM

## 2022-10-20 DIAGNOSIS — F33.1 MODERATE EPISODE OF RECURRENT MAJOR DEPRESSIVE DISORDER (HCC): ICD-10-CM

## 2022-10-20 PROCEDURE — 90833 PSYTX W PT W E/M 30 MIN: CPT | Performed by: STUDENT IN AN ORGANIZED HEALTH CARE EDUCATION/TRAINING PROGRAM

## 2022-10-20 PROCEDURE — 99213 OFFICE O/P EST LOW 20 MIN: CPT | Performed by: STUDENT IN AN ORGANIZED HEALTH CARE EDUCATION/TRAINING PROGRAM

## 2022-10-20 RX ORDER — ESCITALOPRAM OXALATE 10 MG/1
10 TABLET ORAL DAILY
Qty: 90 TABLET | Refills: 1 | Status: SHIPPED | OUTPATIENT
Start: 2022-10-20 | End: 2023-04-18

## 2022-10-20 RX ORDER — ESCITALOPRAM OXALATE 20 MG/1
20 TABLET ORAL DAILY
Qty: 90 TABLET | Refills: 1 | Status: SHIPPED | OUTPATIENT
Start: 2022-10-20 | End: 2023-04-18

## 2022-11-25 ENCOUNTER — OFFICE VISIT (OUTPATIENT)
Dept: URGENT CARE | Facility: CLINIC | Age: 32
End: 2022-11-25

## 2022-11-25 VITALS
RESPIRATION RATE: 18 BRPM | OXYGEN SATURATION: 98 % | SYSTOLIC BLOOD PRESSURE: 106 MMHG | HEART RATE: 83 BPM | TEMPERATURE: 96.7 F | DIASTOLIC BLOOD PRESSURE: 70 MMHG

## 2022-11-25 DIAGNOSIS — J01.00 ACUTE MAXILLARY SINUSITIS, RECURRENCE NOT SPECIFIED: Primary | ICD-10-CM

## 2022-11-25 DIAGNOSIS — Z20.822 ENCOUNTER FOR LABORATORY TESTING FOR COVID-19 VIRUS: ICD-10-CM

## 2022-11-25 DIAGNOSIS — J40 BRONCHITIS: ICD-10-CM

## 2022-11-25 LAB
S PYO AG THROAT QL: NEGATIVE
SARS-COV-2 AG UPPER RESP QL IA: NEGATIVE
VALID CONTROL: NORMAL

## 2022-11-25 RX ORDER — BENZONATATE 100 MG/1
100 CAPSULE ORAL 3 TIMES DAILY PRN
Qty: 20 CAPSULE | Refills: 0 | Status: SHIPPED | OUTPATIENT
Start: 2022-11-25

## 2022-11-25 RX ORDER — PREDNISONE 10 MG/1
TABLET ORAL
Qty: 30 TABLET | Refills: 0 | Status: SHIPPED | OUTPATIENT
Start: 2022-11-25

## 2022-11-25 RX ORDER — AZITHROMYCIN 250 MG/1
TABLET, FILM COATED ORAL
Qty: 6 TABLET | Refills: 0 | Status: SHIPPED | OUTPATIENT
Start: 2022-11-25 | End: 2022-11-29

## 2022-11-25 NOTE — PROGRESS NOTES
[facility]        NAME: Tammy Suero Friday is a 34 y o  male  : 1990    MRN: [id number]  DATE: 2022  TIME: 1:11 PM    /70 (BP Location: Left arm, Patient Position: Sitting, Cuff Size: Standard)   Pulse 83   Temp (!) 96 7 °F (35 9 °C) (Tympanic)   Resp 18   SpO2 98%     Assessment and Plan   Acute maxillary sinusitis, recurrence not specified [J01 00]  1  Acute maxillary sinusitis, recurrence not specified  POCT rapid strepA    Poct Covid 19 Rapid Antigen Test    azithromycin (ZITHROMAX) 250 mg tablet    benzonatate (TESSALON PERLES) 100 mg capsule    predniSONE 10 mg tablet      2  Bronchitis  azithromycin (ZITHROMAX) 250 mg tablet    benzonatate (TESSALON PERLES) 100 mg capsule    predniSONE 10 mg tablet      3  Encounter for laboratory testing for COVID-19 virus  azithromycin (ZITHROMAX) 250 mg tablet    benzonatate (TESSALON PERLES) 100 mg capsule    predniSONE 10 mg tablet            Patient Instructions       Follow up with PCP in 3-5 days  Proceed to  ER if symptoms worsen  Chief Complaint     Chief Complaint   Patient presents with   • Cold Like Symptoms     Pt C/O sinus congestion with yellow green mucus, cough and sore throat that started Tuesday  History of Present Illness       Pt with 4 days productive cough and nasal congestion and sore throat       Review of Systems   Review of Systems   Constitutional: Positive for fatigue  HENT: Positive for congestion, sinus pressure, sinus pain and sore throat  Eyes: Negative  Respiratory: Positive for cough  Cardiovascular: Negative  Gastrointestinal: Negative  Endocrine: Negative  Genitourinary: Negative  Musculoskeletal: Negative  Skin: Negative  Allergic/Immunologic: Negative  Neurological: Negative  Hematological: Negative  Psychiatric/Behavioral: Negative  All other systems reviewed and are negative          Current Medications       Current Outpatient Medications:   • azithromycin (ZITHROMAX) 250 mg tablet, Take 2 tablets today then 1 tablet daily x 4 days, Disp: 6 tablet, Rfl: 0  •  benzonatate (TESSALON PERLES) 100 mg capsule, Take 1 capsule (100 mg total) by mouth 3 (three) times a day as needed for cough, Disp: 20 capsule, Rfl: 0  •  Cholecalciferol (VITAMIN D-3 PO), Take by mouth, Disp: , Rfl:   •  clobetasol (TEMOVATE) 0 05 % ointment, APPLY TWICE A DAY AS NEEDED PSORIASIS, Disp: , Rfl:   •  escitalopram (Lexapro) 10 mg tablet, Take 1 tablet (10 mg total) by mouth daily Take 10 mg and 20 mg (total: 30 mg) daily, Disp: 90 tablet, Rfl: 1  •  escitalopram (LEXAPRO) 20 mg tablet, Take 1 tablet (20 mg total) by mouth daily, Disp: 90 tablet, Rfl: 1  •  Fluticasone Propionate, Inhal, (Flovent Diskus) 100 MCG/BLIST AEPB, Inhale 2 puffs in the morning Rinse mouth after use , Disp: 180 blister, Rfl: 0  •  HUMIRA PEN 40 MG/0 8ML PNKT, , Disp: , Rfl:   •  predniSONE 10 mg tablet, 5 tabs po qd x 2 days then 4 tabs po qd x 2 days then 3 tabs po qd x 2 days then 2 tabs po qd x 2 days then 1 tab po qd x 2 days, Disp: 30 tablet, Rfl: 0    Current Allergies     Allergies as of 11/25/2022   • (No Known Allergies)            The following portions of the patient's history were reviewed and updated as appropriate: allergies, current medications, past family history, past medical history, past social history, past surgical history and problem list      Past Medical History:   Diagnosis Date   • Childhood asthma    • Psoriasis     last assessed 9/4/14       Past Surgical History:   Procedure Laterality Date   • HERNIA REPAIR         Family History   Problem Relation Age of Onset   • Psoriasis Father    • Diabetes Paternal Grandfather    • Heart disease Paternal Grandfather          Medications have been verified          Objective   /70 (BP Location: Left arm, Patient Position: Sitting, Cuff Size: Standard)   Pulse 83   Temp (!) 96 7 °F (35 9 °C) (Tympanic)   Resp 18   SpO2 98% Physical Exam     Physical Exam  Vitals and nursing note reviewed  Constitutional:       Appearance: Normal appearance  He is normal weight  HENT:      Head: Normocephalic and atraumatic  Right Ear: Tympanic membrane, ear canal and external ear normal       Left Ear: Tympanic membrane, ear canal and external ear normal       Nose: Congestion and rhinorrhea present  Comments: Max sinus tender to palp   Boggy mucosa  Yellow d/c     Mouth/Throat:      Mouth: Mucous membranes are moist       Pharynx: Oropharynx is clear  Eyes:      Extraocular Movements: Extraocular movements intact  Conjunctiva/sclera: Conjunctivae normal       Pupils: Pupils are equal, round, and reactive to light  Cardiovascular:      Rate and Rhythm: Normal rate and regular rhythm  Pulses: Normal pulses  Heart sounds: Normal heart sounds  Pulmonary:      Effort: Pulmonary effort is normal       Comments: Minor coarse sounds   Abdominal:      General: Abdomen is flat  Bowel sounds are normal       Palpations: Abdomen is soft  Musculoskeletal:         General: Normal range of motion  Cervical back: Normal range of motion and neck supple  Skin:     General: Skin is warm  Capillary Refill: Capillary refill takes less than 2 seconds  Neurological:      General: No focal deficit present  Mental Status: He is alert and oriented to person, place, and time  Mental status is at baseline     Psychiatric:         Mood and Affect: Mood normal          Behavior: Behavior normal

## 2022-11-27 LAB — BACTERIA THROAT CULT: NORMAL

## 2023-02-07 ENCOUNTER — TELEPHONE (OUTPATIENT)
Dept: PSYCHIATRY | Facility: CLINIC | Age: 33
End: 2023-02-07

## 2023-02-14 ENCOUNTER — OFFICE VISIT (OUTPATIENT)
Dept: FAMILY MEDICINE CLINIC | Facility: CLINIC | Age: 33
End: 2023-02-14

## 2023-02-14 VITALS
WEIGHT: 219 LBS | HEART RATE: 86 BPM | SYSTOLIC BLOOD PRESSURE: 110 MMHG | DIASTOLIC BLOOD PRESSURE: 70 MMHG | OXYGEN SATURATION: 97 % | BODY MASS INDEX: 33.19 KG/M2 | HEIGHT: 68 IN

## 2023-02-14 DIAGNOSIS — J40 BRONCHITIS: Primary | ICD-10-CM

## 2023-02-14 LAB
SARS-COV-2 AG UPPER RESP QL IA: NEGATIVE
VALID CONTROL: NORMAL

## 2023-02-14 RX ORDER — AZITHROMYCIN 250 MG/1
TABLET, FILM COATED ORAL
Qty: 6 TABLET | Refills: 0 | Status: SHIPPED | OUTPATIENT
Start: 2023-02-14 | End: 2023-02-19

## 2023-02-14 RX ORDER — METHYLPREDNISOLONE 4 MG/1
TABLET ORAL
Qty: 21 EACH | Refills: 0 | Status: SHIPPED | OUTPATIENT
Start: 2023-02-14 | End: 2023-02-23

## 2023-02-14 RX ORDER — ADALIMUMAB 40MG/0.4ML
KIT SUBCUTANEOUS
COMMUNITY
Start: 2022-12-10

## 2023-02-14 NOTE — PROGRESS NOTES
Assessment/Plan:     1  Bronchitis  Assessment & Plan:  Advised likely bronchitis; exam benign; advised on medrol dose juon and abx; advised to fu with derm regarding humira; COVID19 negative; advised on supportive care and OTC symptom relief; f/u guidance given    Orders:  -     POCT Rapid Covid Ag  -     methylPREDNISolone 4 MG tablet therapy pack; Use as directed on package  -     azithromycin (Zithromax) 250 mg tablet; Take 2 tablets (500 mg total) by mouth daily for 1 day, THEN 1 tablet (250 mg total) daily for 4 days  Subjective:      Patient ID: Anton Gentile Friday is a 34 y o  male  Upper Respiratory Infection  Patient complains of symptoms of a URI, possible sinusitis  Symptoms include congestion, cough described as productive, nasal congestion, no  fever, shortness of breath and sinus pressure  Onset of symptoms was 1 week ago, and has been unchanged since that time  Treatment to date: cough suppressants and decongestants  Has not checked for COVID19  Concerned as he is getting SOB going up stairs  The following portions of the patient's history were reviewed and updated as appropriate: allergies, current medications, past family history, past medical history, past social history, past surgical history, and problem list     Review of Systems   Constitutional: Positive for fatigue  Negative for chills and fever  HENT: Positive for congestion and sinus pressure  Respiratory: Positive for cough  Neurological: Positive for headaches  Objective:      /70 (BP Location: Right arm, Patient Position: Sitting, Cuff Size: Large)   Pulse 86   Ht 5' 7 72" (1 72 m)   Wt 99 3 kg (219 lb)   SpO2 97%   BMI 33 57 kg/m²          Physical Exam  Vitals reviewed  Constitutional:       General: He is not in acute distress  Appearance: Normal appearance  He is not ill-appearing, toxic-appearing or diaphoretic  HENT:      Head: Normocephalic and atraumatic        Right Ear: Tympanic membrane normal       Left Ear: Tympanic membrane normal       Nose: Mucosal edema and congestion present  Mouth/Throat:      Pharynx: No oropharyngeal exudate or posterior oropharyngeal erythema  Eyes:      General: No scleral icterus  Right eye: No discharge  Left eye: No discharge  Conjunctiva/sclera: Conjunctivae normal    Cardiovascular:      Rate and Rhythm: Normal rate and regular rhythm  Pulses: Normal pulses  Heart sounds: Normal heart sounds  No murmur heard  No gallop  Pulmonary:      Effort: Pulmonary effort is normal  No respiratory distress  Breath sounds: Normal breath sounds  No stridor  No wheezing, rhonchi or rales  Musculoskeletal:      Cervical back: Neck supple  Right lower leg: No edema  Left lower leg: No edema  Lymphadenopathy:      Cervical: No cervical adenopathy  Neurological:      General: No focal deficit present  Mental Status: He is alert and oriented to person, place, and time  Psychiatric:         Mood and Affect: Mood normal          Behavior: Behavior normal          Thought Content:  Thought content normal          Judgment: Judgment normal

## 2023-02-14 NOTE — ASSESSMENT & PLAN NOTE
Advised likely bronchitis; exam benign; advised on medrol dose juno and abx; advised to fu with derm regarding humira; COVID19 negative; advised on supportive care and OTC symptom relief; f/u guidance given

## 2023-02-23 ENCOUNTER — OFFICE VISIT (OUTPATIENT)
Dept: FAMILY MEDICINE CLINIC | Facility: CLINIC | Age: 33
End: 2023-02-23

## 2023-02-23 VITALS
OXYGEN SATURATION: 97 % | TEMPERATURE: 98.2 F | HEIGHT: 69 IN | SYSTOLIC BLOOD PRESSURE: 106 MMHG | HEART RATE: 91 BPM | BODY MASS INDEX: 32.41 KG/M2 | WEIGHT: 218.8 LBS | DIASTOLIC BLOOD PRESSURE: 90 MMHG

## 2023-02-23 DIAGNOSIS — K21.9 GASTROESOPHAGEAL REFLUX DISEASE WITHOUT ESOPHAGITIS: Primary | ICD-10-CM

## 2023-02-23 RX ORDER — OMEPRAZOLE 20 MG/1
20 CAPSULE, DELAYED RELEASE ORAL DAILY
Qty: 30 CAPSULE | Refills: 0 | Status: SHIPPED | OUTPATIENT
Start: 2023-02-23

## 2023-02-23 NOTE — PROGRESS NOTES
Assessment/Plan:   1  Gastroesophageal reflux disease without esophagitis  Reviewed patient's symptoms today  At this time, symptoms appear likely secondary to reflux disease  Reviewed the pathophysiology of this problem as well as the differential possible causes  He was advised on importance of dietary triggers to avoid  We will start him empirically with omeprazole 20 mg daily  Follow-up if any symptoms worsen  - omeprazole (PriLOSEC) 20 mg delayed release capsule; Take 1 capsule (20 mg total) by mouth daily  Dispense: 30 capsule; Refill: 0           There are no diagnoses linked to this encounter  Subjective:       Chief Complaint   Patient presents with   • Abdominal Pain     Cramping, burning in esophageal region, vomiting for past several months  Concerned about reflux      Patient ID: Babita Garcia Friday is a 34 y o  male  Abdominal Pain  This is a new problem  Episode onset: 2-3 months  The problem occurs intermittently  The problem has been unchanged  The pain is located in the epigastric region  The pain is at a severity of 8/10  The quality of the pain is burning  The abdominal pain radiates to the chest and right shoulder  Associated symptoms include nausea  Pertinent negatives include no arthralgias, belching, constipation, diarrhea, dysuria, fever, frequency, headaches, melena, myalgias or vomiting  He has tried H2 blockers for the symptoms  The treatment provided mild relief  Review of Systems   Constitutional: Negative for activity change, chills, fatigue and fever  HENT: Negative for congestion, ear pain, sinus pressure and sore throat  Eyes: Negative for redness, itching and visual disturbance  Respiratory: Negative for cough and shortness of breath  Cardiovascular: Negative for chest pain and palpitations  Gastrointestinal: Positive for abdominal pain and nausea  Negative for constipation, diarrhea, melena and vomiting     Endocrine: Negative for cold intolerance and heat intolerance  Genitourinary: Negative for dysuria, flank pain and frequency  Musculoskeletal: Negative for arthralgias, back pain, gait problem and myalgias  Skin: Negative for color change  Allergic/Immunologic: Negative for environmental allergies  Neurological: Negative for dizziness, numbness and headaches  Psychiatric/Behavioral: Negative for behavioral problems and sleep disturbance  The following portions of the patient's history were reviewed and updated as appropriate : past family history, past medical history, past social history and past surgical history  Current Outpatient Medications:   •  clobetasol (TEMOVATE) 0 05 % ointment, APPLY TWICE A DAY AS NEEDED PSORIASIS, Disp: , Rfl:   •  escitalopram (Lexapro) 10 mg tablet, Take 1 tablet (10 mg total) by mouth daily Take 10 mg and 20 mg (total: 30 mg) daily, Disp: 90 tablet, Rfl: 1  •  escitalopram (LEXAPRO) 20 mg tablet, Take 1 tablet (20 mg total) by mouth daily, Disp: 90 tablet, Rfl: 1  •  Humira Pen 40 MG/0 4ML PNKT, , Disp: , Rfl:          Objective:         Vitals:    02/23/23 0931   BP: 106/90   BP Location: Left arm   Patient Position: Sitting   Cuff Size: Adult   Pulse: 91   Temp: 98 2 °F (36 8 °C)   SpO2: 97%   Weight: 99 2 kg (218 lb 12 8 oz)   Height: 5' 8 5" (1 74 m)     Physical Exam  Vitals reviewed  Constitutional:       Appearance: He is well-developed  HENT:      Head: Normocephalic and atraumatic  Nose: Nose normal       Mouth/Throat:      Pharynx: No oropharyngeal exudate  Eyes:      General: No scleral icterus  Right eye: No discharge  Left eye: No discharge  Pupils: Pupils are equal, round, and reactive to light  Neck:      Trachea: No tracheal deviation  Cardiovascular:      Rate and Rhythm: Normal rate and regular rhythm  Pulses:           Dorsalis pedis pulses are 2+ on the right side and 2+ on the left side          Posterior tibial pulses are 2+ on the right side and 2+ on the left side  Heart sounds: Normal heart sounds  No murmur heard  No friction rub  No gallop  Pulmonary:      Effort: Pulmonary effort is normal  No respiratory distress  Breath sounds: Normal breath sounds  No wheezing or rales  Abdominal:      General: Bowel sounds are normal  There is no distension  Palpations: Abdomen is soft  Tenderness: There is no abdominal tenderness  There is no guarding or rebound  Musculoskeletal:         General: Normal range of motion  Cervical back: Normal range of motion and neck supple  Lymphadenopathy:      Head:      Right side of head: No submental or submandibular adenopathy  Left side of head: No submental or submandibular adenopathy  Cervical: No cervical adenopathy  Right cervical: No superficial, deep or posterior cervical adenopathy  Left cervical: No superficial, deep or posterior cervical adenopathy  Skin:     General: Skin is warm and dry  Findings: No erythema  Neurological:      Mental Status: He is alert and oriented to person, place, and time  Cranial Nerves: No cranial nerve deficit  Sensory: No sensory deficit  Psychiatric:         Mood and Affect: Mood is not anxious or depressed  Speech: Speech normal          Behavior: Behavior normal          Thought Content:  Thought content normal          Judgment: Judgment normal
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
## 2023-03-09 ENCOUNTER — TELEMEDICINE (OUTPATIENT)
Dept: PSYCHIATRY | Facility: CLINIC | Age: 33
End: 2023-03-09

## 2023-03-09 DIAGNOSIS — F41.1 GAD (GENERALIZED ANXIETY DISORDER): ICD-10-CM

## 2023-03-09 DIAGNOSIS — F33.1 MODERATE EPISODE OF RECURRENT MAJOR DEPRESSIVE DISORDER (HCC): ICD-10-CM

## 2023-03-09 DIAGNOSIS — F42.2 MIXED OBSESSIONAL THOUGHTS AND ACTS: Primary | ICD-10-CM

## 2023-03-09 RX ORDER — ESCITALOPRAM OXALATE 20 MG/1
20 TABLET ORAL DAILY
Qty: 90 TABLET | Refills: 1 | Status: SHIPPED | OUTPATIENT
Start: 2023-03-09 | End: 2023-09-05

## 2023-03-09 RX ORDER — ESCITALOPRAM OXALATE 10 MG/1
10 TABLET ORAL DAILY
Qty: 90 TABLET | Refills: 1 | Status: SHIPPED | OUTPATIENT
Start: 2023-03-09 | End: 2023-09-05

## 2023-03-09 NOTE — BH TREATMENT PLAN
Treatment Plan done but not signed at time of office visit due to:  Plan reviewed with the patient during the virtual visit and verbal consent given  TREATMENT PLAN (Medication Management Only)        Jeremy Pickett [facility]    Name and Date of Birth:  Annabella Lamas Friday 34 y o  1990  Date of Treatment Plan: March 9, 2023  Diagnosis/Diagnoses:    1  Mixed obsessional thoughts and acts    2  Moderate episode of recurrent major depressive disorder ([address] )    3  DEBORA (generalized anxiety disorder)      Strengths/Personal Resources for Self-Care: "supprotive family, girldfriend, hobbies and coping skills learned in therapy"  Area/Areas of need (in own words): "OCD sxs, anxiety and depression"  1  Long Term Goal: maintain good control of OCD sxs, anxiety and depression  Target Date:6 months - 9/9/2023  Person/Persons responsible for completion of goal: Annabella Lamas  2  Short Term Objective (s) - How will we reach this goal?:   A  Provider new recommended medication/dosage changes and/or continue medication(s): continue current medications as prescribed  B  N/A   C  N/A  Target Date:6 months - 9/9/2023  Person/Persons Responsible for Completion of Goal: Annabella Lamas  Progress Towards Goals: continuing treatment  Treatment Modality: medication management every 6 months  Review due 180 days from date of this plan: 6 months - 9/9/2023  Expected length of service: maintenance  My Physician/PA/NP and I have developed this plan together and I agree to work on the goals and objectives  I understand the treatment goals that were developed for my treatment

## 2023-07-15 ENCOUNTER — OFFICE VISIT (OUTPATIENT)
Dept: URGENT CARE | Age: 33
End: 2023-07-15
Payer: COMMERCIAL

## 2023-07-15 VITALS
HEIGHT: 70 IN | OXYGEN SATURATION: 98 % | DIASTOLIC BLOOD PRESSURE: 80 MMHG | HEART RATE: 86 BPM | RESPIRATION RATE: 18 BRPM | BODY MASS INDEX: 28.06 KG/M2 | TEMPERATURE: 98 F | WEIGHT: 196 LBS | SYSTOLIC BLOOD PRESSURE: 120 MMHG

## 2023-07-15 DIAGNOSIS — J02.9 SORE THROAT: Primary | ICD-10-CM

## 2023-07-15 DIAGNOSIS — J02.9 VIRAL PHARYNGITIS: ICD-10-CM

## 2023-07-15 LAB — S PYO AG THROAT QL: NEGATIVE

## 2023-07-15 PROCEDURE — 99213 OFFICE O/P EST LOW 20 MIN: CPT | Performed by: NURSE PRACTITIONER

## 2023-07-15 PROCEDURE — 87880 STREP A ASSAY W/OPTIC: CPT | Performed by: NURSE PRACTITIONER

## 2023-07-15 PROCEDURE — 87070 CULTURE OTHR SPECIMN AEROBIC: CPT | Performed by: NURSE PRACTITIONER

## 2023-07-15 NOTE — PATIENT INSTRUCTIONS
Take zyrtec or allegra daily  Use flonase 1-2 sprays in each nare daily   Use nasal saline to the nose,   Use humidifer in room  Symptoms worsen go to ER  Rest    Rapid strep negative, sent for a culture  No bacterial infection noted today no need for antibiotics.   Follow-up with family doctor

## 2023-07-15 NOTE — PROGRESS NOTES
NAME: Geno Helms Friday is a 34 y.o. male  : 1990    MRN: [id number]    /80 (BP Location: Left arm, Patient Position: Sitting)   Pulse 86   Temp 98 °F (36.7 °C) (Tympanic)   Resp 18   Ht 5' 10" (1.778 m)   Wt 88.9 kg (196 lb)   SpO2 98%   BMI 28.12 kg/m²     12:43 PM    Assessment and Plan   Sore throat [J02.9]  1. Sore throat  POCT rapid strepA    Throat culture      2. Viral pharyngitis  Throat culture          Geno Helms was seen today for sore throat. Diagnoses and all orders for this visit:    Sore throat  -     POCT rapid strepA  -     Throat culture    Viral pharyngitis  -     Throat culture    rapid strep is negative, sent for a culture     Patient Instructions   Patient Instructions   Take zyrtec or allegra daily  Use flonase 1-2 sprays in each nare daily   Use nasal saline to the nose,   Use humidifer in room  Symptoms worsen go to ER  Rest    Rapid strep negative, sent for a culture  No bacterial infection noted today no need for antibiotics. Follow-up with family doctor        Proceed to the nearest ER if symptoms worsen, Follow up with your PCP  Continue to social distance, wash your hands, and wear your masks. Please continue to follow the CDC. gov guidelines daily for they are subject to change on COVID-19    Chief Complaint     Chief Complaint   Patient presents with   • Sore Throat     Productive sore throat since Monday. Slight bodyaches, no chills, fever, nausea or vomiting. Patient works in a warehouse. No known exposure. History of Present Illness     25-year-old man here today with a sore throat that is been present since Monday. He has slight body aches but no chills nausea vomiting diarrhea or fevers. 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 Sore throats painful more so on the right side has not taken anything over-the-counter. Review of Systems   Review of Systems   Constitutional: Negative for fatigue and fever.    HENT: Positive for sore throat. Negative for congestion, ear pain, nosebleeds, postnasal drip, sinus pressure and sinus pain. Eyes: Negative. Respiratory: Negative. Cardiovascular: Negative. Gastrointestinal: Negative. Musculoskeletal: Negative. Neurological: Negative. Psychiatric/Behavioral: Negative. Current Medications       Current Outpatient Medications:   •  clobetasol (TEMOVATE) 0.05 % ointment, APPLY TWICE A DAY AS NEEDED PSORIASIS, Disp: , Rfl:   •  escitalopram (Lexapro) 10 mg tablet, Take 1 tablet (10 mg total) by mouth daily Take 10 mg and 20 mg (total: 30 mg) daily, Disp: 90 tablet, Rfl: 1  •  escitalopram (LEXAPRO) 20 mg tablet, Take 1 tablet (20 mg total) by mouth daily Take 10 mg and 20 mg (total: 30 mg) daily, Disp: 90 tablet, Rfl: 1  •  Humira Pen 40 MG/0.4ML PNKT, , Disp: , Rfl:   •  omeprazole (PriLOSEC) 20 mg delayed release capsule, Take 1 capsule (20 mg total) by mouth daily (Patient not taking: Reported on 7/15/2023), Disp: 30 capsule, Rfl: 0    Current Allergies     Allergies as of 07/15/2023   • (No Known Allergies)              Past Medical History:   Diagnosis Date   • Anxiety    • Childhood asthma    • Depression    • GERD (gastroesophageal reflux disease)    • Psoriasis     last assessed 9/4/14       Past Surgical History:   Procedure Laterality Date   • HERNIA REPAIR         Family History   Problem Relation Age of Onset   • Psoriasis Father    • Diabetes Paternal Grandfather    • Heart disease Paternal Grandfather    • Depression Mother          Medications have been verified.     The following portions of the patient's history were reviewed and updated as appropriate: allergies, current medications, past family history, past medical history, past social history, past surgical history and problem list.    Objective   /80 (BP Location: Left arm, Patient Position: Sitting)   Pulse 86   Temp 98 °F (36.7 °C) (Tympanic)   Resp 18   Ht 5' 10" (1.778 m)   Wt 88.9 kg (196 lb)   SpO2 98%   BMI 28.12 kg/m²      Physical Exam     Physical Exam  Constitutional:       Appearance: He is well-developed. He is not ill-appearing. HENT:      Head: Normocephalic. Right Ear: Hearing, tympanic membrane, ear canal and external ear normal.      Left Ear: Hearing, tympanic membrane, ear canal and external ear normal.      Nose: Nose normal.      Mouth/Throat:      Lips: Pink. Mouth: Mucous membranes are moist. No oral lesions. Pharynx: Oropharynx is clear. Posterior oropharyngeal erythema present. No pharyngeal swelling, oropharyngeal exudate or uvula swelling. Tonsils: No tonsillar exudate or tonsillar abscesses. 0 on the right. 0 on the left. Comments: Erythema present, no white exudate noted in back of throat  Eyes:      Pupils: Pupils are equal, round, and reactive to light. Cardiovascular:      Rate and Rhythm: Normal rate and regular rhythm. Heart sounds: Normal heart sounds. No murmur heard. Pulmonary:      Effort: Pulmonary effort is normal. No respiratory distress. Breath sounds: Normal breath sounds and air entry. No decreased breath sounds, wheezing or rhonchi. Abdominal:      Palpations: Abdomen is soft. Musculoskeletal:      Cervical back: Normal range of motion. Skin:     General: Skin is warm. Capillary Refill: Capillary refill takes less than 2 seconds. Neurological:      Mental Status: He is alert. Note: Portions of this record may have been created with voice recognition software. Occasional wrong word or "sound a like" substitutions may have occurred due to the inherent limitations of voice recognition software. Please read the chart carefully and recognize, using context, where substitutions have occurred. NIC Tovar

## 2023-07-18 LAB — BACTERIA THROAT CULT: NORMAL

## 2023-08-03 ENCOUNTER — TELEPHONE (OUTPATIENT)
Dept: PSYCHIATRY | Facility: CLINIC | Age: 33
End: 2023-08-03

## 2023-08-03 NOTE — TELEPHONE ENCOUNTER
Patient called to schedule follow up. Provided number for [facility] office where provider is located. Please contact patient to schedule follow up. Thank you.

## 2023-08-16 ENCOUNTER — OFFICE VISIT (OUTPATIENT)
Dept: URGENT CARE | Age: 33
End: 2023-08-16
Payer: COMMERCIAL

## 2023-08-16 ENCOUNTER — APPOINTMENT (OUTPATIENT)
Dept: RADIOLOGY | Age: 33
End: 2023-08-16
Payer: COMMERCIAL

## 2023-08-16 VITALS
HEART RATE: 86 BPM | HEIGHT: 70 IN | DIASTOLIC BLOOD PRESSURE: 78 MMHG | OXYGEN SATURATION: 99 % | SYSTOLIC BLOOD PRESSURE: 130 MMHG | WEIGHT: 185 LBS | TEMPERATURE: 97.6 F | BODY MASS INDEX: 26.48 KG/M2 | RESPIRATION RATE: 18 BRPM

## 2023-08-16 DIAGNOSIS — M79.671 RIGHT FOOT PAIN: Primary | ICD-10-CM

## 2023-08-16 DIAGNOSIS — M79.671 RIGHT FOOT PAIN: ICD-10-CM

## 2023-08-16 PROCEDURE — 73630 X-RAY EXAM OF FOOT: CPT

## 2023-08-16 PROCEDURE — 99213 OFFICE O/P EST LOW 20 MIN: CPT | Performed by: PHYSICIAN ASSISTANT

## 2023-08-16 NOTE — PROGRESS NOTES
[facility]        NAME: Harsh moreno Friday is a 28 y.o. male  : 1990    MRN: [id number]  DATE: 2023  TIME: 12:37 PM    /78   Pulse 86   Temp 97.6 °F (36.4 °C)   Resp 18   Ht 5' 10" (1.778 m)   Wt 83.9 kg (185 lb)   SpO2 99%   BMI 26.54 kg/m²     Assessment and Plan   Right foot pain [M79.671]  1. Right foot pain  XR foot 3+ vw right    Ambulatory referral to Orthopedic Surgery            Patient Instructions       Follow up with PCP in 3-5 days. Proceed to  ER if symptoms worsen. Chief Complaint     Chief Complaint   Patient presents with   • Foot Pain     Right foot pain/swelling near the ball of the foot started 2 weeks ago. Pt is not aware of any injury. History of Present Illness       Pt with right foot pain  X 2 week no trauma  Ball of foot pain       Review of Systems   Review of Systems   Constitutional: Negative. HENT: Negative. Eyes: Negative. Respiratory: Negative. Cardiovascular: Negative. Gastrointestinal: Negative. Endocrine: Negative. Genitourinary: Negative. Musculoskeletal: Negative. Skin: Negative. Allergic/Immunologic: Negative. Neurological: Negative. Hematological: Negative. Psychiatric/Behavioral: Negative. All other systems reviewed and are negative.         Current Medications       Current Outpatient Medications:   •  clobetasol (TEMOVATE) 0.05 % ointment, APPLY TWICE A DAY AS NEEDED PSORIASIS, Disp: , Rfl:   •  escitalopram (Lexapro) 10 mg tablet, Take 1 tablet (10 mg total) by mouth daily Take 10 mg and 20 mg (total: 30 mg) daily, Disp: 90 tablet, Rfl: 1  •  escitalopram (LEXAPRO) 20 mg tablet, Take 1 tablet (20 mg total) by mouth daily Take 10 mg and 20 mg (total: 30 mg) daily, Disp: 90 tablet, Rfl: 1  •  Humira Pen 40 MG/0.4ML PNKT, , Disp: , Rfl:   •  omeprazole (PriLOSEC) 20 mg delayed release capsule, Take 1 capsule (20 mg total) by mouth daily (Patient not taking: Reported on 7/15/2023), Disp: 30 capsule, Rfl: 0    Current Allergies     Allergies as of 08/16/2023   • (No Known Allergies)            The following portions of the patient's history were reviewed and updated as appropriate: allergies, current medications, past family history, past medical history, past social history, past surgical history and problem list.     Past Medical History:   Diagnosis Date   • Anxiety    • Childhood asthma    • Depression    • GERD (gastroesophageal reflux disease)    • Psoriasis     last assessed 9/4/14       Past Surgical History:   Procedure Laterality Date   • HERNIA REPAIR         Family History   Problem Relation Age of Onset   • Psoriasis Father    • Diabetes Paternal Grandfather    • Heart disease Paternal Grandfather    • Depression Mother          Medications have been verified. Objective   /78   Pulse 86   Temp 97.6 °F (36.4 °C)   Resp 18   Ht 5' 10" (1.778 m)   Wt 83.9 kg (185 lb)   SpO2 99%   BMI 26.54 kg/m²        Physical Exam     Physical Exam  Vitals and nursing note reviewed. Constitutional:       Appearance: Normal appearance. He is normal weight. Comments: Pt declines cam boot    Cardiovascular:      Rate and Rhythm: Normal rate and regular rhythm. Pulses: Normal pulses. Heart sounds: Normal heart sounds. Pulmonary:      Effort: Pulmonary effort is normal.      Breath sounds: Normal breath sounds. Abdominal:      General: Abdomen is flat. Bowel sounds are normal.      Palpations: Abdomen is soft. Musculoskeletal:         General: Normal range of motion. Comments: Ball of foot pain no swelling no erythema no  Toes from     Skin:     General: Skin is warm. Capillary Refill: Capillary refill takes less than 2 seconds. Neurological:      General: No focal deficit present. Mental Status: He is alert and oriented to person, place, and time.    Psychiatric:         Mood and Affect: Mood normal.

## 2023-09-07 ENCOUNTER — OFFICE VISIT (OUTPATIENT)
Dept: URGENT CARE | Facility: CLINIC | Age: 33
End: 2023-09-07
Payer: COMMERCIAL

## 2023-09-07 VITALS
RESPIRATION RATE: 20 BRPM | SYSTOLIC BLOOD PRESSURE: 120 MMHG | OXYGEN SATURATION: 96 % | DIASTOLIC BLOOD PRESSURE: 88 MMHG | TEMPERATURE: 100.5 F | HEART RATE: 108 BPM

## 2023-09-07 DIAGNOSIS — R05.1 ACUTE COUGH: Primary | ICD-10-CM

## 2023-09-07 DIAGNOSIS — J06.9 VIRAL URI WITH COUGH: ICD-10-CM

## 2023-09-07 LAB
SARS-COV-2 AG UPPER RESP QL IA: NEGATIVE
VALID CONTROL: NORMAL

## 2023-09-07 PROCEDURE — 87811 SARS-COV-2 COVID19 W/OPTIC: CPT | Performed by: NURSE PRACTITIONER

## 2023-09-07 PROCEDURE — 99213 OFFICE O/P EST LOW 20 MIN: CPT | Performed by: NURSE PRACTITIONER

## 2023-09-07 RX ORDER — BENZONATATE 200 MG/1
200 CAPSULE ORAL 3 TIMES DAILY PRN
Qty: 20 CAPSULE | Refills: 0 | Status: SHIPPED | OUTPATIENT
Start: 2023-09-07

## 2023-09-07 NOTE — PATIENT INSTRUCTIONS
Viral Syndrome   AMBULATORY CARE:   Viral syndrome  is a term used for symptoms of an infection caused by a virus. Viruses are spread easily from person to person on shared items. Signs and symptoms  may start slowly or suddenly and last hours to days. They can be mild to severe and can change over days or hours. You may have any of the following:  Fever and chills    A runny or stuffy nose    Cough, sore throat, or hoarseness    Headache, or pain and pressure around your eyes    Muscle aches and joint pain    Shortness of breath or wheezing    Abdominal pain, cramps, and diarrhea    Nausea, vomiting, or loss of appetite    Call your local emergency number (911 in the [address]), or have someone call if:   You have a seizure. You cannot be woken. You have chest pain or trouble breathing. Seek care immediately if:   You have a stiff neck, a bad headache, and sensitivity to light. You feel weak, dizzy, or confused. You stop urinating or urinate a lot less than usual.    You cough up blood. You have severe abdominal pain or your abdomen is larger than usual.    Call your doctor if:   Your symptoms do not get better with treatment or get worse after 10 days. You have a rash or ear pain. You have burning when you urinate. You have questions or concerns about your condition or care. Treatment for viral syndrome  may include medicines to manage your symptoms. Antibiotics are not given for a viral infection. You may  need any of the following:  Acetaminophen  decreases pain and fever. It is available without a doctor's order. Ask how much to take and how often to take it. Follow directions. Read the labels of all other medicines you are using to see if they also contain acetaminophen, or ask your doctor or pharmacist. Acetaminophen can cause liver damage if not taken correctly. NSAIDs , such as ibuprofen, help decrease swelling, pain, and fever.  NSAIDs can cause stomach bleeding or kidney problems in certain people. If you take blood thinner medicine, always ask your healthcare provider if NSAIDs are safe for you. Always read the medicine label and follow directions. Cold medicine  helps decrease swelling, control a cough, and relieve chest or nasal congestion. Saline nasal spray  helps decrease nasal congestion. Manage your symptoms:   Drink liquids as directed to prevent dehydration. Ask how much liquid to drink each day and which liquids are best for you. Do not drink liquids with caffeine. Caffeine can make dehydration worse. Get plenty of rest to help your body heal.  Take naps throughout the day. Ask your healthcare provider when you can return to work and your normal activities. Use a cool mist humidifier  to increase air moisture in your home. This may make it easier for you to breathe and help decrease your cough. Drink tea with honey or use cough drops for a sore throat. Cough drops are available without a doctor's order. Follow directions for taking cough drops. Do not smoke or be close to anyone who is smoking. Nicotine and other chemicals in cigarettes and cigars can cause lung damage. Smoking can also delay healing. Ask your healthcare provider for information if you currently smoke and need help to quit. E-cigarettes or smokeless tobacco still contain nicotine. Talk to your healthcare provider before you use these products. Prevent the spread of germs:   Wash your hands often throughout the day. Use soap and water. Rub your soapy hands together, lacing your fingers, for at least 20 seconds. Rinse with warm, running water. Dry your hands with a clean towel or paper towel. Use hand  that contains alcohol if soap and water are not available. Teach children how to wash their hands and use hand . Cover sneezes and coughs. Turn your face away and cover your mouth and nose with a tissue. Throw the tissue away.  Use the bend of your arm if a tissue is not available. Then wash your hands well with soap and water or use hand . Teach children how to cover a cough or sneeze. Stay home while you are sick. Avoid crowds as much as possible. Get the influenza (flu) vaccine as soon as recommended each year. The flu vaccine is available starting in September or October. Ask your healthcare provider about the pneumonia vaccine. This vaccine is usually recommended every 5 years in older adults. Follow up with your doctor as directed:  Write down your questions so you remember to ask them during your visits. © Copyright Mike Kang 2022 Information is for End User's use only and may not be sold, redistributed or otherwise used for commercial purposes. The above information is an  only. It is not intended as medical advice for individual conditions or treatments. Talk to your doctor, nurse or pharmacist before following any medical regimen to see if it is safe and effective for you.

## 2023-09-07 NOTE — PROGRESS NOTES
[facility]        NAME: Valentina Shipley Friday is a 28 y.o. male  : 1990    MRN: [id number]  DATE: 2023  TIME: 4:08 PM    Assessment and Plan   Acute cough [R05.1]  1. Acute cough  Poct Covid 19 Rapid Antigen Test      2. Viral URI with cough  Pseudoephedrine-Naproxen Na -220 MG TB12    benzonatate (TESSALON) 200 MG capsule        Viral uri with negative covid test   Start aleve cold and sinus along with tessalon for cough   rec rest, increase fluids, vitamins   Pt in agreement with plan of care    Patient Instructions     Follow up with PCP in 3-5 days. Proceed to  ER if symptoms worsen. Chief Complaint     Chief Complaint   Patient presents with   • Cold Like Symptoms     Pt C/O coughing, body aches, sinus congestion that started on Monday. Pt reports trying allergy medication and cold relief with no symptom improvement. History of Present Illness   Valentina Shipley Friday presents to the clinic c/o    Cold Like Symptoms (Pt C/O coughing, body aches, sinus congestion that started on Monday. Pt reports trying allergy medication and cold relief with no symptom improvement. )  Did not do a covid test.   No known sick contacts      Review of Systems   Review of Systems   All other systems reviewed and are negative.         Current Medications     Long-Term Medications   Medication Sig Dispense Refill   • clobetasol (TEMOVATE) 0.05 % ointment APPLY TWICE A DAY AS NEEDED PSORIASIS (Patient not taking: Reported on 2023)     • escitalopram (Lexapro) 10 mg tablet Take 1 tablet (10 mg total) by mouth daily Take 10 mg and 20 mg (total: 30 mg) daily 90 tablet 1   • escitalopram (LEXAPRO) 20 mg tablet Take 1 tablet (20 mg total) by mouth daily Take 10 mg and 20 mg (total: 30 mg) daily 90 tablet 1   • Humira Pen 40 MG/0.4ML PNKT  (Patient not taking: Reported on 7/15/2023)     • omeprazole (PriLOSEC) 20 mg delayed release capsule Take 1 capsule (20 mg total) by mouth daily (Patient not taking: Reported on 7/15/2023) 30 capsule 0       Current Allergies     Allergies as of 09/07/2023   • (No Known Allergies)            The following portions of the patient's history were reviewed and updated as appropriate: allergies, current medications, past family history, past medical history, past social history, past surgical history and problem list.    Objective   /88 (BP Location: Left arm, Patient Position: Sitting, Cuff Size: Standard)   Pulse (!) 108   Temp 100.5 °F (38.1 °C) (Tympanic)   Resp 20   SpO2 96%        Physical Exam     Physical Exam  Vitals and nursing note reviewed. Constitutional:       Appearance: Normal appearance. He is well-developed. HENT:      Head: Normocephalic and atraumatic. Right Ear: Hearing, tympanic membrane, ear canal and external ear normal.      Left Ear: Hearing, tympanic membrane, ear canal and external ear normal.      Nose: Mucosal edema and congestion present. Right Sinus: No maxillary sinus tenderness or frontal sinus tenderness. Left Sinus: No maxillary sinus tenderness or frontal sinus tenderness. Mouth/Throat:      Mouth: Mucous membranes are moist.      Pharynx: Oropharynx is clear. Eyes:      General: Lids are normal.      Conjunctiva/sclera: Conjunctivae normal.      Pupils: Pupils are equal, round, and reactive to light. Cardiovascular:      Rate and Rhythm: Normal rate and regular rhythm. Pulses: Normal pulses. Heart sounds: Normal heart sounds, S1 normal and S2 normal.   Pulmonary:      Effort: Pulmonary effort is normal.      Breath sounds: Normal breath sounds. Musculoskeletal:      Cervical back: Normal range of motion and neck supple. Skin:     General: Skin is warm and dry. Neurological:      Mental Status: He is alert. Psychiatric:         Speech: Speech normal.         Behavior: Behavior normal.         Thought Content:  Thought content normal.         Judgment: Judgment normal.

## 2023-09-10 ENCOUNTER — OFFICE VISIT (OUTPATIENT)
Dept: URGENT CARE | Age: 33
End: 2023-09-10
Payer: COMMERCIAL

## 2023-09-10 VITALS
TEMPERATURE: 98.2 F | HEIGHT: 70 IN | DIASTOLIC BLOOD PRESSURE: 84 MMHG | BODY MASS INDEX: 28.63 KG/M2 | OXYGEN SATURATION: 96 % | HEART RATE: 84 BPM | RESPIRATION RATE: 20 BRPM | WEIGHT: 200 LBS | SYSTOLIC BLOOD PRESSURE: 121 MMHG

## 2023-09-10 DIAGNOSIS — J06.9 VIRAL URI WITH COUGH: Primary | ICD-10-CM

## 2023-09-10 PROCEDURE — 99213 OFFICE O/P EST LOW 20 MIN: CPT | Performed by: FAMILY MEDICINE

## 2023-09-10 RX ORDER — BROMPHENIRAMINE MALEATE, PSEUDOEPHEDRINE HYDROCHLORIDE, AND DEXTROMETHORPHAN HYDROBROMIDE 2; 30; 10 MG/5ML; MG/5ML; MG/5ML
10 SYRUP ORAL 4 TIMES DAILY PRN
Qty: 120 ML | Refills: 0 | Status: SHIPPED | OUTPATIENT
Start: 2023-09-10

## 2023-09-10 NOTE — PROGRESS NOTES
[facility]    NAME: Sofiya Toure Friday is a 28 y.o. male  : 1990    MRN: [id number]  DATE: September 10, 2023  TIME: 12:23 PM    Assessment and Plan   Viral URI with cough [J06.9]  1. Viral URI with cough  brompheniramine-pseudoephedrine-DM 30-2-10 MG/5ML syrup        Continue current management at home  Will add Pseudoephedrine as nasal decongestant  Work note written    Patient Instructions     Follow up with PCP in 3-5 days. Proceed to  ER if symptoms worsen. Chief Complaint     Chief Complaint   Patient presents with   • Cough     Cough, "night sweats", body aches, fatigue x 6 days         History of Present Illness   HPI  Sofiya Toure Friday is a 28 y.o. male  who [facility] the [facility] today for a cough. Pt sates symptoms started 6 days ago on 2023 as a post nasal drip , which then continue into nasal congestion, chills, body aches and a cough. He was sen in Urgent Care 2 days ago and tested negative for Covid. Since then he has been taking Tessalon Perles, Tylenol and Benadryl. Today he is still not feeling well, he is tired, has a deep cough and feels fatigued. Denies any fever at home, but does feels sweats. + loose stools, + lightheadedness      Review of Systems   Review of Systems   Constitutional: Positive for fatigue. Negative for appetite change, chills and fever. HENT: Positive for congestion. Negative for rhinorrhea and sore throat. Respiratory: Positive for cough. Negative for shortness of breath. Cardiovascular: Negative for chest pain. Gastrointestinal: Negative for diarrhea, nausea and vomiting. Neurological: Negative for dizziness, light-headedness and headaches.        Current Medications       Current Outpatient Medications:   •  benzonatate (TESSALON) 200 MG capsule, Take 1 capsule (200 mg total) by mouth 3 (three) times a day as needed for cough, Disp: 20 capsule, Rfl: 0  •  brompheniramine-pseudoephedrine-DM 30-2-10 MG/5ML syrup, Take 10 mL by mouth 4 (four) times a day as needed for congestion or allergies, Disp: 120 mL, Rfl: 0  •  escitalopram (Lexapro) 10 mg tablet, Take 1 tablet (10 mg total) by mouth daily Take 10 mg and 20 mg (total: 30 mg) daily, Disp: 90 tablet, Rfl: 1  •  escitalopram (LEXAPRO) 20 mg tablet, Take 1 tablet (20 mg total) by mouth daily Take 10 mg and 20 mg (total: 30 mg) daily, Disp: 90 tablet, Rfl: 1  •  clobetasol (TEMOVATE) 0.05 % ointment, APPLY TWICE A DAY AS NEEDED PSORIASIS (Patient not taking: Reported on 9/7/2023), Disp: , Rfl:   •  Humira Pen 40 MG/0.4ML PNKT, , Disp: , Rfl:   •  omeprazole (PriLOSEC) 20 mg delayed release capsule, Take 1 capsule (20 mg total) by mouth daily (Patient not taking: Reported on 7/15/2023), Disp: 30 capsule, Rfl: 0    Current Allergies     Allergies as of 09/10/2023   • (No Known Allergies)            The following portions of the patient's history were reviewed and updated as appropriate: allergies, current medications, past family history, past medical history, past social history, past surgical history and problem list.     Past Medical History:   Diagnosis Date   • Anxiety    • Childhood asthma    • Depression    • GERD (gastroesophageal reflux disease)    • Psoriasis     last assessed 9/4/14       Past Surgical History:   Procedure Laterality Date   • HERNIA REPAIR         Family History   Problem Relation Age of Onset   • Psoriasis Father    • Diabetes Paternal Grandfather    • Heart disease Paternal Grandfather    • Depression Mother      Medications have been verified. Objective   /84   Pulse 84   Temp 98.2 °F (36.8 °C)   Resp 20   Ht 5' 10" (1.778 m)   Wt 90.7 kg (200 lb)   SpO2 96%   BMI 28.70 kg/m²   No LMP for male patient. Physical Exam     Physical Exam  Vitals and nursing note reviewed. Constitutional:       Appearance: He is well-developed. HENT:      Head: Normocephalic and atraumatic.       Right Ear: Tympanic membrane, ear canal and external ear normal. Left Ear: Tympanic membrane, ear canal and external ear normal.      Nose: Congestion present. Mouth/Throat:      Mouth: Mucous membranes are moist.      Pharynx: Posterior oropharyngeal erythema present. Eyes:      Extraocular Movements: Extraocular movements intact. Conjunctiva/sclera: Conjunctivae normal.   Cardiovascular:      Rate and Rhythm: Normal rate and regular rhythm. Heart sounds: Normal heart sounds. Pulmonary:      Effort: Pulmonary effort is normal. No respiratory distress. Breath sounds: Normal breath sounds. Neurological:      Mental Status: He is alert and oriented to person, place, and time.    Psychiatric:         Mood and Affect: Mood normal.         Behavior: Behavior normal.

## 2023-09-10 NOTE — LETTER
September 10, 2023     Patient: Raj Flores Friday   YOB: 1990   Date of Visit: 9/10/2023       To Whom it May Concern:    Raj Flores Friday was seen in my clinic on 9/10/2023. He may return to work on 9/13/23 . If you have any questions or concerns, please don't hesitate to call.          Sincerely,          Shant Perez MD        CC: No Recipients

## 2023-09-10 NOTE — PATIENT INSTRUCTIONS
Continue current management at home  Will add Pseudoephedrine as nasal decongestant    Acute Cough   AMBULATORY CARE:   An acute cough  can last up to 3 weeks. Common causes of an acute cough include a cold, allergies, or a lung infection. Seek care immediately if:   You have trouble breathing or feel short of breath. You cough up blood, or you see blood in your mucus. You faint or feel weak or dizzy. You have chest pain when you cough or take a deep breath. You have new wheezing. Contact your healthcare provider if:   You have a fever. Your cough lasts longer than 4 weeks. Your symptoms do not improve with treatment. You have questions or concerns about your condition or care. Treatment:  An acute cough usually goes away on its own. Ask your healthcare provider about medicines you can take to decrease your cough. You may need medicine to stop the cough, decrease swelling in your airways, or help open your airways. Medicine may also be given to help you cough up mucus. If you have an infection caused by bacteria, you may need antibiotics. Manage your symptoms:   Do not smoke and stay away from others who smoke. Nicotine and other chemicals in cigarettes and cigars can cause lung damage and make your cough worse. Ask your healthcare provider for information if you currently smoke and need help to quit. E-cigarettes or smokeless tobacco still contain nicotine. Talk to your healthcare provider before you use these products. Drink extra liquids as directed. Liquids will help thin and loosen mucus so you can cough it up. Liquids will also help prevent dehydration. Examples of good liquids to drink include water, fruit juice, and broth. Do not drink liquids that contain caffeine. Caffeine can increase your risk for dehydration. Ask your healthcare provider how much liquid to drink each day. Rest as directed. Do not do activities that make your cough worse, such as exercise.     Use a humidifier or vaporizer. Use a cool mist humidifier or a vaporizer to increase air moisture in your home. This may make it easier for you to breathe and help decrease your cough. Eat 2 to 5 mL of honey 2 times each day. Honey can help thin mucus and decrease your cough. Use cough drops or lozenges. These can help decrease throat irritation and your cough. Follow up with your healthcare provider as directed:  Write down your questions so you remember to ask them during your visits. © Copyright Alcira Burnham 2022 Information is for End User's use only and may not be sold, redistributed or otherwise used for commercial purposes. The above information is an  only. It is not intended as medical advice for individual conditions or treatments. Talk to your doctor, nurse or pharmacist before following any medical regimen to see if it is safe and effective for you.

## 2023-09-21 DIAGNOSIS — F33.1 MODERATE EPISODE OF RECURRENT MAJOR DEPRESSIVE DISORDER (HCC): ICD-10-CM

## 2023-09-21 DIAGNOSIS — F42.2 MIXED OBSESSIONAL THOUGHTS AND ACTS: ICD-10-CM

## 2023-09-21 DIAGNOSIS — F41.1 GAD (GENERALIZED ANXIETY DISORDER): ICD-10-CM

## 2023-09-21 RX ORDER — ESCITALOPRAM OXALATE 10 MG/1
10 TABLET ORAL DAILY
Qty: 90 TABLET | Refills: 0 | Status: SHIPPED | OUTPATIENT
Start: 2023-09-21 | End: 2024-03-19

## 2023-09-21 RX ORDER — ESCITALOPRAM OXALATE 20 MG/1
20 TABLET ORAL DAILY
Qty: 90 TABLET | Refills: 0 | Status: SHIPPED | OUTPATIENT
Start: 2023-09-21 | End: 2024-03-19

## 2023-09-21 NOTE — TELEPHONE ENCOUNTER
Refill was sent to the preferred pharmacy. The patient cancelled the appointment on 5/4/23 and did not schedule a f/u appointment. Staff will contact the patient to schedule a f/u visit; otherwise, no more refills could be provided.

## 2023-10-06 ENCOUNTER — OFFICE VISIT (OUTPATIENT)
Dept: FAMILY MEDICINE CLINIC | Facility: CLINIC | Age: 33
End: 2023-10-06
Payer: COMMERCIAL

## 2023-10-06 VITALS
RESPIRATION RATE: 16 BRPM | SYSTOLIC BLOOD PRESSURE: 108 MMHG | WEIGHT: 213.6 LBS | HEART RATE: 89 BPM | BODY MASS INDEX: 31.64 KG/M2 | HEIGHT: 69 IN | OXYGEN SATURATION: 97 % | DIASTOLIC BLOOD PRESSURE: 78 MMHG | TEMPERATURE: 97.9 F

## 2023-10-06 DIAGNOSIS — Z13.1 SCREENING FOR DIABETES MELLITUS: ICD-10-CM

## 2023-10-06 DIAGNOSIS — Z13.29 SCREENING FOR THYROID DISORDER: ICD-10-CM

## 2023-10-06 DIAGNOSIS — M79.671 RIGHT FOOT PAIN: ICD-10-CM

## 2023-10-06 DIAGNOSIS — Z00.00 ANNUAL PHYSICAL EXAM: Primary | ICD-10-CM

## 2023-10-06 DIAGNOSIS — Z13.220 SCREENING FOR CHOLESTEROL LEVEL: ICD-10-CM

## 2023-10-06 DIAGNOSIS — Z13.0 SCREENING FOR IRON DEFICIENCY ANEMIA: ICD-10-CM

## 2023-10-06 PROCEDURE — 99395 PREV VISIT EST AGE 18-39: CPT | Performed by: FAMILY MEDICINE

## 2023-10-06 NOTE — PROGRESS NOTES
ADULT ANNUAL [facility]    NAME: La Nena Vallejo Friday  AGE: 28 y.o. SEX: male  : 1990     DATE: 10/6/2023     Assessment and Plan:     Problem List Items Addressed This Visit    None  Visit Diagnoses     Annual physical exam    -  Primary    BMI 28.0-28.9,adult              Immunizations and preventive care screenings were discussed with patient today. Appropriate education was printed on patient's after visit summary. Counseling:  Alcohol/drug use: discussed moderation in alcohol intake, the recommendations for healthy alcohol use, and avoidance of illicit drug use. Dental Health: discussed importance of regular tooth brushing, flossing, and dental visits. Injury prevention: discussed safety/seat belts, safety helmets, smoke detectors, carbon dioxide detectors, and smoking near bedding or upholstery. Sexual health: discussed sexually transmitted diseases, partner selection, use of condoms, avoidance of unintended pregnancy, and contraceptive alternatives. · Exercise: the importance of regular exercise/physical activity was discussed. Recommend exercise 3-5 times per week for at least 30 minutes. No follow-ups on file. Chief Complaint:     No chief complaint on file. History of Present Illness:     Adult Annual Physical   Patient here for a comprehensive physical exam. The patient reports problems - foot pain. Diet and Physical Activity  · Diet/Nutrition: well balanced diet. · Exercise: no formal exercise. Depression Screening  PHQ-2/9 Depression Screening         General Health  · Sleep: sleeps well. · Hearing: normal - bilateral.  · Vision: goes for regular eye exams. · Dental: regular dental visits.         Health  · History of STDs?: no.     Review of Systems:     Review of Systems   Past Medical History:     Past Medical History:   Diagnosis Date   • Anxiety    • Childhood asthma    • Depression    • GERD (gastroesophageal reflux disease)    • Psoriasis     last assessed 9/4/14      Past Surgical History:     Past Surgical History:   Procedure Laterality Date   • HERNIA REPAIR        Social History:     Social History     Socioeconomic History   • Marital status: Single     Spouse name: Not on file   • Number of children: Not on file   • Years of education: Not on file   • Highest education level: Not on file   Occupational History   • Not on file   Tobacco Use   • Smoking status: Former     Packs/day: 0.25     Years: 2.00     Total pack years: 0.50     Types: Cigarettes     Start date: 2000     Quit date: 4/9/2020     Years since quitting: 3.4   • Smokeless tobacco: Never   Vaping Use   • Vaping Use: Never used   Substance and Sexual Activity   • Alcohol use: Yes     Comment: Once a month   • Drug use: No   • Sexual activity: Yes     Partners: Female   Other Topics Concern   • Not on file   Social History Narrative   • Not on file     Social Determinants of Health     Financial Resource Strain: Not on file   Food Insecurity: Not on file   Transportation Needs: Not on file   Physical Activity: Not on file   Stress: Not on file   Social Connections: Not on file   Intimate Partner Violence: Not on file   Housing Stability: Not on file      Family History:     Family History   Problem Relation Age of Onset   • Psoriasis Father    • Diabetes Paternal Grandfather    • Heart disease Paternal Grandfather    • Depression Mother       Current Medications:     Current Outpatient Medications   Medication Sig Dispense Refill   • benzonatate (TESSALON) 200 MG capsule Take 1 capsule (200 mg total) by mouth 3 (three) times a day as needed for cough 20 capsule 0   • brompheniramine-pseudoephedrine-DM 30-2-10 MG/5ML syrup Take 10 mL by mouth 4 (four) times a day as needed for congestion or allergies 120 mL 0   • clobetasol (TEMOVATE) 0.05 % ointment APPLY TWICE A DAY AS NEEDED PSORIASIS (Patient not taking: Reported on 9/7/2023) • escitalopram (LEXAPRO) 10 mg tablet TAKE 1 TABLET (10 MG TOTAL) BY MOUTH DAILY TAKE 10 MG AND 20 MG (TOTAL: 30 MG) DAILY 90 tablet 0   • escitalopram (LEXAPRO) 20 mg tablet TAKE 1 TABLET (20 MG TOTAL) BY MOUTH DAILY TAKE 10 MG AND 20 MG (TOTAL: 30 MG) DAILY 90 tablet 0   • Humira Pen 40 MG/0.4ML PNKT  (Patient not taking: Reported on 7/15/2023)     • omeprazole (PriLOSEC) 20 mg delayed release capsule Take 1 capsule (20 mg total) by mouth daily (Patient not taking: Reported on 7/15/2023) 30 capsule 0     No current facility-administered medications for this visit. Allergies:     No Known Allergies   Physical Exam:     There were no vitals taken for this visit.     Physical Exam     Ihab DO Kevin   [facility] [address] [place] [address]

## 2023-10-14 LAB
EXTERNAL HIV SCREEN: NORMAL
HBA1C MFR BLD HPLC: 5.6 %
HCV AB SER-ACNC: NORMAL

## 2023-11-21 ENCOUNTER — DOCUMENTATION (OUTPATIENT)
Dept: PSYCHIATRY | Facility: CLINIC | Age: 33
End: 2023-11-21

## 2023-11-21 DIAGNOSIS — F42.2 MIXED OBSESSIONAL THOUGHTS AND ACTS: Primary | ICD-10-CM

## 2023-11-21 DIAGNOSIS — F33.1 MODERATE EPISODE OF RECURRENT MAJOR DEPRESSIVE DISORDER (HCC): ICD-10-CM

## 2023-11-21 DIAGNOSIS — F41.1 GAD (GENERALIZED ANXIETY DISORDER): ICD-10-CM

## 2023-11-21 NOTE — PSYCH
[address]    Name and Date of Birth:  Carlito Holland Friday 28 y.o. 1990    Admission Date: 9/3/2021  discharge Date: 11/21/2023 Referral source: family physician    Discharge Type: Did not return for follow up    Discharge Diagnosis:     1. Mixed obsessional thoughts and acts        2. Moderate episode of recurrent major depressive disorder ([address])        3. DEBORA (generalized anxiety disorder)          Treating Physician: Ulysses Ramos MD     Treatment Complications: Non Compliance with Treatment. Did not return for follow up. Admit with discharge: No    Prognosis at time of discharge: Guarded    Presenting Problems/Pertinent Findings:      Carlito Holland is a 28 y.o.  male, single, domiciled w/ a roommate, employed at [employer], w/ [address] of psoriasis, bronchitis and COVID-19 infection (in 1/2022) and PPH of anxiety, no prior psychiatric admissions, one prior SA (by drinking alcohol and cutting in 2016), no h/o self-injurious behavior, in individual therapy since 2020 by Eli Haas who presented to the mental health clinic for the initial intake and psychiatric evaluation on 9/3/2021 as referred by his therapist. Presented w/ depressive sxs in the past, and anxiety sxs, worrying excessively and over-thinking started in high school. Also reported OCD sxs as checking lock, stove, lights etc several times. Denied SI/HI, intent or plan upon direct inquiry at this time. DEBORA-7: 11; PHQ-9: 7. His current presentation meets criteria for MDD, DEBORA and OCD. Therapist: None    Course of Treatment: Psychiatric Evaluation and Medication Management    Summary of Treatment Progress:     Carlito Holland was seen for initial Psychiatric Evaluation and medication management on 9/3/2021 as referred by his therapist. Misty Jorge w/ depressive sxs in the past, and anxiety sxs, worrying excessively and over-thinking started in high school.  Also reported OCD sxs as checking lock, stove, lights etc several times. Denied SI/HI, intent or plan upon direct inquiry at this time. DEBORA-7: 11; PHQ-9: 7. His current presentation meets criteria for MDD, DEBORA and OCD. Started on Lexapro 5 mg for 6 days and then 10 mg po daily and Melatonin 1 mg 2h before bedtime for sleep; labs ordered. Upon f/u on 2/4/22, presented with improved depressive and anxiety sxs, but continued to report OCD sxs. Lexapro increased to 15 mg for a week and then 20 mg daily to address OCD sxs more effectively. Upon f/u on 4/29/22, presented w/ stable mood and good control of anxiety sxs, and improving OCD sxs. On 8/25/22, endorsed good control of anxiety and depression, but reported "social OCD" sxs affecting his relationship and function. Lexapro increased to 30 mg daily. Pending blood work. Recommended to continue individual therapy. Upon f/u on 10/20/22, presented w/ stable mood and anxiety and marked improvement in OCD sxs since the dose of Lexapro was uptitrated. Maintained on the same dose; pending lab results. The patient was last visited on 3/9/2023 and canceled the appointment on 5/4/2023. The patient did not schedule a follow-up appointment despite multiple outreach attempts. Hence, the patient is being discharged due to noncompliance with outpatient psychiatric care.     Past Psychiatric History:   Past Inpatient Psychiatric Treatment:   No history of past inpatient psychiatric admissions  Past Outpatient Psychiatric Treatment:    Not in outpatient treatment recently  Has a therapist  Past Suicide Attempts: yes, by binge drinking and cutting  Past Violent Behavior: no  Past Psychiatric Medication Trials: none     Traumatic History:   Abuse: no history of physical or sexual abuse  Other Traumatic Events: none     Past Medical History:    Past Medical History:   Diagnosis Date    Anxiety     Childhood asthma     Depression     GERD (gastroesophageal reflux disease)     Psoriasis     last assessed 9/4/14        Past Surgical History:   Procedure Laterality Date    HERNIA REPAIR         Allergies:    No Known Allergies    Substance Abuse History:     Social History     Substance and Sexual Activity   Drug Use No     Social History     Substance and Sexual Activity   Alcohol Use Yes    Comment: Once a month       Family Psychiatric History:     Family History   Problem Relation Age of Onset    Psoriasis Father     Diabetes Paternal Grandfather     Heart disease Paternal Grandfather     Depression Mother        Social History/Trauma History/Past Psychiatric History:    Social History     Socioeconomic History    Marital status: Single     Spouse name: Not on file    Number of children: Not on file    Years of education: Not on file    Highest education level: Not on file   Occupational History    Not on file   Tobacco Use    Smoking status: Former     Packs/day: 0.25     Years: 2.00     Total pack years: 0.50     Types: Cigarettes     Start date: 2000     Quit date: 4/9/2020     Years since quitting: 3.6    Smokeless tobacco: Never   Vaping Use    Vaping Use: Never used   Substance and Sexual Activity    Alcohol use: Yes     Comment: Once a month    Drug use: No    Sexual activity: Yes     Partners: Female   Other Topics Concern    Not on file   Social History Narrative    Not on file     Social Determinants of Health     Financial Resource Strain: Not on file   Food Insecurity: Not on file   Transportation Needs: Not on file   Physical Activity: Not on file   Stress: Not on file   Social Connections: Not on file   Intimate Partner Violence: Not on file   Housing Stability: Not on file       History Review: The following portions of the patient's history were reviewed and updated as appropriate: allergies, current medications, past family history, past medical history, past social history, past surgical history, and problem list.. Reviewed on 3/9/2023.       MENTAL STATUS EVALUATION (at time of most recent visit on 3/9/2023): Appearance and attitude: appeared as stated age, cooperative and attentive, bearded, wearing eyeglasses, casually dressed with good hygiene  Eye contact: good  Motor Function: within normal limits, No PMA/PMR  Gait/station: Not observed  Speech: normal for rate, rhythm, volume, latency, amount  Language: No overt abnormality  Mood/affect: euthymic / Affect was euthymic, reactive, in full range, normal intensity and mood congruent  Thought Processes: sequential and goal-directed  Thought content: denies suicidal ideation or homicidal ideation; no delusions or first rank symptoms  Associations: intact associations  Perceptual disturbances: denies Auditory/Visual/Tactile Hallucinations  Orientation: oriented to time, person, place and to the situational context  Cognitive Function: intact  Memory: recent and remote memory grossly intact  Intellect: average  Fund of knowledge: aware of current events, aware of past history and vocabulary average  Impulse control: good  Insight/judgment: fair/good      To what extent did Chitra Dempsey achieve his goals?: Some    Criteria for Discharge: Did not return for treatment. Did not respond to reminder letter to reschedule follow up appointment. Has demonstrated failure to uphold their treatment plan/contract. Aftercare Recommendations: Follow up with therapist recommended. Follow up with psychiatrist recommended.     Discharge Medications:     Current Outpatient Medications:     benzonatate (TESSALON) 200 MG capsule, Take 1 capsule (200 mg total) by mouth 3 (three) times a day as needed for cough, Disp: 20 capsule, Rfl: 0    brompheniramine-pseudoephedrine-DM 30-2-10 MG/5ML syrup, Take 10 mL by mouth 4 (four) times a day as needed for congestion or allergies, Disp: 120 mL, Rfl: 0    clobetasol (TEMOVATE) 0.05 % ointment, APPLY TWICE A DAY AS NEEDED PSORIASIS (Patient not taking: Reported on 9/7/2023), Disp: , Rfl:     escitalopram (LEXAPRO) 10 mg tablet, TAKE 1 TABLET (10 MG TOTAL) BY MOUTH DAILY TAKE 10 MG AND 20 MG (TOTAL: 30 MG) DAILY, Disp: 90 tablet, Rfl: 0    escitalopram (LEXAPRO) 20 mg tablet, TAKE 1 TABLET (20 MG TOTAL) BY MOUTH DAILY TAKE 10 MG AND 20 MG (TOTAL: 30 MG) DAILY, Disp: 90 tablet, Rfl: 0    Humira Pen 40 MG/0.4ML PNKT, , Disp: , Rfl:     omeprazole (PriLOSEC) 20 mg delayed release capsule, Take 1 capsule (20 mg total) by mouth daily (Patient not taking: Reported on 7/15/2023), Disp: 30 capsule, Rfl: 0     Describe ability and willingness to work and solve mental problems:     May have difficulty solving his mental health problems    Saeed Melara MD 11/21/23

## 2023-12-21 DIAGNOSIS — F33.1 MODERATE EPISODE OF RECURRENT MAJOR DEPRESSIVE DISORDER (HCC): ICD-10-CM

## 2023-12-21 DIAGNOSIS — F41.1 GAD (GENERALIZED ANXIETY DISORDER): ICD-10-CM

## 2023-12-21 DIAGNOSIS — F42.2 MIXED OBSESSIONAL THOUGHTS AND ACTS: ICD-10-CM

## 2023-12-21 RX ORDER — ESCITALOPRAM OXALATE 10 MG/1
10 TABLET ORAL DAILY
Qty: 30 TABLET | Refills: 2 | OUTPATIENT
Start: 2023-12-21 | End: 2024-06-18

## 2023-12-21 RX ORDER — ESCITALOPRAM OXALATE 20 MG/1
20 TABLET ORAL DAILY
Qty: 30 TABLET | Refills: 2 | OUTPATIENT
Start: 2023-12-21 | End: 2024-06-18

## 2023-12-21 NOTE — TELEPHONE ENCOUNTER
Request for refill was declined as the patient is no longer under prescriber's care (discharged on 11/21/23)

## 2024-01-04 ENCOUNTER — OFFICE VISIT (OUTPATIENT)
Dept: FAMILY MEDICINE CLINIC | Facility: CLINIC | Age: 34
End: 2024-01-04
Payer: COMMERCIAL

## 2024-01-04 VITALS
TEMPERATURE: 97.8 F | RESPIRATION RATE: 18 BRPM | WEIGHT: 217 LBS | BODY MASS INDEX: 32.51 KG/M2 | OXYGEN SATURATION: 96 % | HEART RATE: 83 BPM | SYSTOLIC BLOOD PRESSURE: 108 MMHG | DIASTOLIC BLOOD PRESSURE: 78 MMHG

## 2024-01-04 DIAGNOSIS — F42.2 MIXED OBSESSIONAL THOUGHTS AND ACTS: ICD-10-CM

## 2024-01-04 DIAGNOSIS — F33.1 MODERATE EPISODE OF RECURRENT MAJOR DEPRESSIVE DISORDER (HCC): Primary | ICD-10-CM

## 2024-01-04 DIAGNOSIS — F41.1 GAD (GENERALIZED ANXIETY DISORDER): ICD-10-CM

## 2024-01-04 PROBLEM — F41.9 ANXIETY AND DEPRESSION: Status: RESOLVED | Noted: 2024-01-04 | Resolved: 2024-01-04

## 2024-01-04 PROBLEM — F41.9 ANXIETY AND DEPRESSION: Status: ACTIVE | Noted: 2024-01-04

## 2024-01-04 PROBLEM — F32.A ANXIETY AND DEPRESSION: Status: RESOLVED | Noted: 2024-01-04 | Resolved: 2024-01-04

## 2024-01-04 PROBLEM — F32.A ANXIETY AND DEPRESSION: Status: ACTIVE | Noted: 2024-01-04

## 2024-01-04 PROCEDURE — 99213 OFFICE O/P EST LOW 20 MIN: CPT

## 2024-01-04 RX ORDER — ESCITALOPRAM OXALATE 10 MG/1
10 TABLET ORAL DAILY
Qty: 90 TABLET | Refills: 0 | Status: SHIPPED | OUTPATIENT
Start: 2024-01-04 | End: 2024-07-02

## 2024-01-04 RX ORDER — GUSELKUMAB 100 MG/ML
INJECTION SUBCUTANEOUS
COMMUNITY
Start: 2023-10-05

## 2024-01-04 RX ORDER — DICLOFENAC SODIUM 75 MG/1
75 TABLET, DELAYED RELEASE ORAL
COMMUNITY
Start: 2023-12-15 | End: 2024-12-14

## 2024-01-04 RX ORDER — ESCITALOPRAM OXALATE 20 MG/1
20 TABLET ORAL DAILY
Qty: 90 TABLET | Refills: 0 | Status: SHIPPED | OUTPATIENT
Start: 2024-01-04 | End: 2024-07-02

## 2024-01-04 NOTE — ASSESSMENT & PLAN NOTE
"Patient presents today to discuss his anxiety and depression. He has been on Lexapro 30mg for many years, which was prescribed by psychiatry. Patient had missed a few appointments and his usual provider had left the practice, so he was lost to follow up. He has not been able to get a refill of his medication and has been off of it for one week.     He states he has been having fatigue after coming off of the Lexapro, as well as \"bursts\" of anxiety. He denies any other withdrawal symptoms.     I discussed with patient today that since he has been off of the medication for over a week now, it would be safer to titrate him back up to the 30mg dose while he waits to get back into psychiatry. I discussed with him to begin taking Lexapro in the following schedule:   Take one 10mg tablet of Lexapro for 1 week   Increase dose to 20mg for 1 week (may take one 20mg tablet)   Increase dose again to 30mg thereafter (may take one 20mg tablet and one 10mg tablet)  He elicits understanding of the dosing schedule and understands this is the safest way to get him back up to the 30mg dose to avoid side effects such as serotonin syndrome.     Since he is on the 30mg dose of Lexapro and reports it was a good dosage for him and controlled his anxiety and depression well, I discussed him that he needs to get back in with psychiatry, as this dosage is above the recommended FDA dose of Lexapro. He understands and will call his previous psychiatry office to get an appointment. I did send in a referral in case the patient would require it.     Patient has no thoughts of suicide or homicide and was counseled that if these thoughts develop, he should go to the ER immediately or call the Crisis help line.   "

## 2024-01-04 NOTE — PROGRESS NOTES
"Name: Margarito Friday      : 1990      MRN: [id number]  Encounter Provider: Cecy Yan PA-C  Encounter Date: 2024   Encounter department: [facility]    Assessment & Plan     1. Moderate episode of recurrent major depressive disorder (HCC)  Assessment & Plan:  Patient presents today to discuss his anxiety and depression. He has been on Lexapro 30mg for many years, which was prescribed by psychiatry. Patient had missed a few appointments and his usual provider had left the practice, so he was lost to follow up. He has not been able to get a refill of his medication and has been off of it for one week.     He states he has been having fatigue after coming off of the Lexapro, as well as \"bursts\" of anxiety. He denies any other withdrawal symptoms.     I discussed with patient today that since he has been off of the medication for over a week now, it would be safer to titrate him back up to the 30mg dose while he waits to get back into psychiatry. I discussed with him to begin taking Lexapro in the following schedule:   Take one 10mg tablet of Lexapro for 1 week   Increase dose to 20mg for 1 week (may take one 20mg tablet)   Increase dose again to 30mg thereafter (may take one 20mg tablet and one 10mg tablet)  He elicits understanding of the dosing schedule and understands this is the safest way to get him back up to the 30mg dose to avoid side effects such as serotonin syndrome.     Since he is on the 30mg dose of Lexapro and reports it was a good dosage for him and controlled his anxiety and depression well, I discussed him that he needs to get back in with psychiatry, as this dosage is above the recommended FDA dose of Lexapro. He understands and will call his previous psychiatry office to get an appointment. I did send in a referral in case the patient would require it.     Patient has no thoughts of suicide or homicide and was counseled that if these thoughts develop, he should " "go to the ER immediately or call the Crisis help line.     Orders:  -     Ambulatory referral to Psych Services; Future  -     escitalopram (LEXAPRO) 20 mg tablet; Take 1 tablet (20 mg total) by mouth daily Take one 10mg tablet for one week. Then, increase to 20mg for one week (may take one 20mg tablet) Then, increase to 30mg total for the remainder of prescription (take one 10mg tablet and one 20mg tablet)    2. DEBORA (generalized anxiety disorder)  -     Ambulatory referral to Psych Services; Future  -     escitalopram (LEXAPRO) 20 mg tablet; Take 1 tablet (20 mg total) by mouth daily Take one 10mg tablet for one week. Then, increase to 20mg for one week (may take one 20mg tablet) Then, increase to 30mg total for the remainder of prescription (take one 10mg tablet and one 20mg tablet)    3. Mixed obsessional thoughts and acts  -     Ambulatory referral to Psych Services; Future  -     escitalopram (LEXAPRO) 20 mg tablet; Take 1 tablet (20 mg total) by mouth daily Take one 10mg tablet for one week. Then, increase to 20mg for one week (may take one 20mg tablet) Then, increase to 30mg total for the remainder of prescription (take one 10mg tablet and one 20mg tablet)  -     escitalopram (LEXAPRO) 10 mg tablet; Take 1 tablet (10 mg total) by mouth daily Take one 10mg tablet for one week. Then, increase to 20mg for one week (may take one 20mg tablet) Then, increase to 30mg total for the remainder of prescription (take one 10mg tablet and one 20mg tablet)           Subjective      Patient presents today for an anxiety and depression follow up. Patient was being seen by psychiatry, who was prescribing Lexapro 30mg, however he was lost to follow up due to missing appointments and his provider leaving the practice. He states he has been out of his medication for a week, and has noticed he is more fatigued and his anxiety comes in \"bursts\". No suicidal or homicidal thoughts.     No other concerns today.       Review of Systems "   Constitutional:  Negative for chills, fatigue and fever.   Respiratory:  Negative for cough, chest tightness and shortness of breath.    Cardiovascular:  Negative for chest pain, palpitations and leg swelling.   Neurological:  Negative for dizziness, syncope, light-headedness and headaches.   Psychiatric/Behavioral:  Negative for agitation, behavioral problems, confusion, decreased concentration, dysphoric mood, hallucinations, self-injury, sleep disturbance and suicidal ideas. The patient is nervous/anxious.        Current Outpatient Medications on File Prior to Visit   Medication Sig    Ascorbic Acid, Vitamin C, (VITAMIN C) 100 MG tablet Take by mouth daily    diclofenac (VOLTAREN) 75 mg EC tablet Take 75 mg by mouth    guselkumab (Tremfya) subcutaneous injection     benzonatate (TESSALON) 200 MG capsule Take 1 capsule (200 mg total) by mouth 3 (three) times a day as needed for cough    brompheniramine-pseudoephedrine-DM 30-2-10 MG/5ML syrup Take 10 mL by mouth 4 (four) times a day as needed for congestion or allergies    clobetasol (TEMOVATE) 0.05 % ointment APPLY TWICE A DAY AS NEEDED PSORIASIS (Patient not taking: Reported on 9/7/2023)    Humira Pen 40 MG/0.4ML PNKT  (Patient not taking: Reported on 7/15/2023)    omeprazole (PriLOSEC) 20 mg delayed release capsule Take 1 capsule (20 mg total) by mouth daily (Patient not taking: Reported on 7/15/2023)    [DISCONTINUED] escitalopram (LEXAPRO) 10 mg tablet TAKE 1 TABLET (10 MG TOTAL) BY MOUTH DAILY TAKE 10 MG AND 20 MG (TOTAL: 30 MG) DAILY (Patient not taking: Reported on 1/4/2024)    [DISCONTINUED] escitalopram (LEXAPRO) 20 mg tablet TAKE 1 TABLET (20 MG TOTAL) BY MOUTH DAILY TAKE 10 MG AND 20 MG (TOTAL: 30 MG) DAILY (Patient not taking: Reported on 1/4/2024)       Objective     /78 (BP Location: Left arm, Patient Position: Sitting, Cuff Size: Standard)   Pulse 83   Temp 97.8 °F (36.6 °C) (Temporal)   Resp 18   Wt 98.4 kg (217 lb)   SpO2 96%   BMI  32.51 kg/m²     Physical Exam  Constitutional:       General: He is not in acute distress.     Appearance: Normal appearance. He is not ill-appearing or diaphoretic.   HENT:      Head: Normocephalic and atraumatic.   Cardiovascular:      Rate and Rhythm: Normal rate and regular rhythm.      Heart sounds: No murmur heard.  Pulmonary:      Effort: Pulmonary effort is normal. No respiratory distress.   Skin:     Coloration: Skin is not cyanotic or pale.   Neurological:      General: No focal deficit present.      Mental Status: He is alert and oriented to person, place, and time.   Psychiatric:         Mood and Affect: Mood normal.         Behavior: Behavior normal. Behavior is cooperative.         Judgment: Judgment normal.       Cecy Yan PA-C

## 2024-01-04 NOTE — PATIENT INSTRUCTIONS
Restart Lexapro medication:   Take 10 mg for one week   Increase total dosage to 20 mg one week later   Increase total dosage to 30 mg (1-10mg tablet and 1-20mg tablet) one week later

## 2024-01-26 DIAGNOSIS — F33.1 MODERATE EPISODE OF RECURRENT MAJOR DEPRESSIVE DISORDER (HCC): ICD-10-CM

## 2024-01-26 DIAGNOSIS — F41.1 GAD (GENERALIZED ANXIETY DISORDER): ICD-10-CM

## 2024-01-26 DIAGNOSIS — F42.2 MIXED OBSESSIONAL THOUGHTS AND ACTS: ICD-10-CM

## 2024-01-26 RX ORDER — ESCITALOPRAM OXALATE 20 MG/1
20 TABLET ORAL DAILY
Qty: 270 TABLET | Refills: 0 | Status: SHIPPED | OUTPATIENT
Start: 2024-01-26 | End: 2024-07-24

## 2024-01-27 ENCOUNTER — HOSPITAL ENCOUNTER (EMERGENCY)
Facility: HOSPITAL | Age: 34
Discharge: HOME | End: 2024-01-27
Attending: EMERGENCY MEDICINE
Payer: COMMERCIAL

## 2024-01-27 VITALS
DIASTOLIC BLOOD PRESSURE: 85 MMHG | TEMPERATURE: 98.8 F | SYSTOLIC BLOOD PRESSURE: 114 MMHG | RESPIRATION RATE: 19 BRPM | BODY MASS INDEX: 33.23 KG/M2 | WEIGHT: 221.8 LBS | HEART RATE: 70 BPM | OXYGEN SATURATION: 95 %

## 2024-01-27 DIAGNOSIS — R79.89 ELEVATED LFTS: ICD-10-CM

## 2024-01-27 DIAGNOSIS — R11.2 NAUSEA & VOMITING: ICD-10-CM

## 2024-01-27 DIAGNOSIS — R10.13 EPIGASTRIC ABDOMINAL PAIN: Primary | ICD-10-CM

## 2024-01-27 LAB
ALBUMIN SERPL BCP-MCNC: 4.6 G/DL (ref 3.5–5)
ALP SERPL-CCNC: 65 U/L (ref 34–104)
ALT SERPL W P-5'-P-CCNC: 94 U/L (ref 7–52)
ANION GAP SERPL CALCULATED.3IONS-SCNC: 8 MMOL/L
AST SERPL W P-5'-P-CCNC: 45 U/L (ref 13–39)
BASOPHILS # BLD AUTO: 0.06 THOUSANDS/ÂΜL (ref 0–0.1)
BASOPHILS NFR BLD AUTO: 1 % (ref 0–1)
BILIRUB SERPL-MCNC: 0.39 MG/DL (ref 0.2–1)
BUN SERPL-MCNC: 17 MG/DL (ref 5–25)
CALCIUM SERPL-MCNC: 10 MG/DL (ref 8.4–10.2)
CHLORIDE SERPL-SCNC: 101 MMOL/L (ref 96–108)
CO2 SERPL-SCNC: 28 MMOL/L (ref 21–32)
CREAT SERPL-MCNC: 0.85 MG/DL (ref 0.6–1.3)
EOSINOPHIL # BLD AUTO: 0.19 THOUSAND/ÂΜL (ref 0–0.61)
EOSINOPHIL NFR BLD AUTO: 2 % (ref 0–6)
ERYTHROCYTE [DISTWIDTH] IN BLOOD BY AUTOMATED COUNT: 12.5 % (ref 11.6–15.1)
GFR SERPL CREATININE-BSD FRML MDRD: 113 ML/MIN/1.73SQ M
GLUCOSE SERPL-MCNC: 105 MG/DL (ref 65–140)
HCT VFR BLD AUTO: 46.2 % (ref 36.5–49.3)
HGB BLD-MCNC: 15.7 G/DL (ref 12–17)
IMM GRANULOCYTES # BLD AUTO: 0.02 THOUSAND/UL (ref 0–0.2)
IMM GRANULOCYTES NFR BLD AUTO: 0 % (ref 0–2)
LIPASE SERPL-CCNC: 11 U/L (ref 11–82)
LYMPHOCYTES # BLD AUTO: 1.95 THOUSANDS/ÂΜL (ref 0.6–4.47)
LYMPHOCYTES NFR BLD AUTO: 25 % (ref 14–44)
MCH RBC QN AUTO: 29.9 PG (ref 26.8–34.3)
MCHC RBC AUTO-ENTMCNC: 34 G/DL (ref 31.4–37.4)
MCV RBC AUTO: 88 FL (ref 82–98)
MONOCYTES # BLD AUTO: 0.7 THOUSAND/ÂΜL (ref 0.17–1.22)
MONOCYTES NFR BLD AUTO: 9 % (ref 4–12)
NEUTROPHILS # BLD AUTO: 4.98 THOUSANDS/ÂΜL (ref 1.85–7.62)
NEUTS SEG NFR BLD AUTO: 63 % (ref 43–75)
NRBC BLD AUTO-RTO: 0 /100 WBCS
PLATELET # BLD AUTO: 309 THOUSANDS/UL (ref 149–390)
PMV BLD AUTO: 9.4 FL (ref 8.9–12.7)
POTASSIUM SERPL-SCNC: 3.9 MMOL/L (ref 3.5–5.3)
PROT SERPL-MCNC: 8 G/DL (ref 6.4–8.4)
RBC # BLD AUTO: 5.25 MILLION/UL (ref 3.88–5.62)
SODIUM SERPL-SCNC: 137 MMOL/L (ref 135–147)
WBC # BLD AUTO: 7.9 THOUSAND/UL (ref 4.31–10.16)

## 2024-01-27 PROCEDURE — 96374 THER/PROPH/DIAG INJ IV PUSH: CPT

## 2024-01-27 PROCEDURE — 36415 COLL VENOUS BLD VENIPUNCTURE: CPT

## 2024-01-27 PROCEDURE — 93005 ELECTROCARDIOGRAM TRACING: CPT

## 2024-01-27 PROCEDURE — 99285 EMERGENCY DEPT VISIT HI MDM: CPT | Performed by: EMERGENCY MEDICINE

## 2024-01-27 PROCEDURE — 80053 COMPREHEN METABOLIC PANEL: CPT

## 2024-01-27 PROCEDURE — 96375 TX/PRO/DX INJ NEW DRUG ADDON: CPT

## 2024-01-27 PROCEDURE — 85025 COMPLETE CBC W/AUTO DIFF WBC: CPT

## 2024-01-27 PROCEDURE — 83690 ASSAY OF LIPASE: CPT

## 2024-01-27 PROCEDURE — C9113 INJ PANTOPRAZOLE SODIUM, VIA: HCPCS

## 2024-01-27 PROCEDURE — 99285 EMERGENCY DEPT VISIT HI MDM: CPT

## 2024-01-27 RX ORDER — DICYCLOMINE HCL 20 MG
20 TABLET ORAL ONCE
Status: DISCONTINUED | OUTPATIENT
Start: 2024-01-27 | End: 2024-01-27

## 2024-01-27 RX ORDER — METOCLOPRAMIDE HYDROCHLORIDE 5 MG/ML
10 INJECTION INTRAMUSCULAR; INTRAVENOUS ONCE
Status: CANCELLED | OUTPATIENT
Start: 2024-01-27 | End: 2024-01-27

## 2024-01-27 RX ORDER — KETOROLAC TROMETHAMINE 30 MG/ML
15 INJECTION, SOLUTION INTRAMUSCULAR; INTRAVENOUS ONCE
Status: COMPLETED | OUTPATIENT
Start: 2024-01-27 | End: 2024-01-27

## 2024-01-27 RX ORDER — DROPERIDOL 2.5 MG/ML
0.62 INJECTION, SOLUTION INTRAMUSCULAR; INTRAVENOUS ONCE
Status: CANCELLED | OUTPATIENT
Start: 2024-01-27 | End: 2024-01-27

## 2024-01-27 RX ORDER — TRIAMCINOLONE ACETONIDE 1 MG/G
CREAM TOPICAL
COMMUNITY
Start: 2024-01-18

## 2024-01-27 RX ORDER — PANTOPRAZOLE SODIUM 40 MG/10ML
40 INJECTION, POWDER, LYOPHILIZED, FOR SOLUTION INTRAVENOUS ONCE
Status: COMPLETED | OUTPATIENT
Start: 2024-01-27 | End: 2024-01-27

## 2024-01-27 RX ORDER — ONDANSETRON 4 MG/1
4 TABLET, FILM COATED ORAL EVERY 8 HOURS PRN
Qty: 15 TABLET | Refills: 0 | Status: SHIPPED | OUTPATIENT
Start: 2024-01-27 | End: 2024-02-03

## 2024-01-27 RX ORDER — MAGNESIUM HYDROXIDE/ALUMINUM HYDROXICE/SIMETHICONE 120; 1200; 1200 MG/30ML; MG/30ML; MG/30ML
30 SUSPENSION ORAL ONCE
Status: COMPLETED | OUTPATIENT
Start: 2024-01-27 | End: 2024-01-27

## 2024-01-27 RX ORDER — ONDANSETRON 2 MG/ML
4 INJECTION INTRAMUSCULAR; INTRAVENOUS ONCE
Status: COMPLETED | OUTPATIENT
Start: 2024-01-27 | End: 2024-01-27

## 2024-01-27 RX ORDER — ONDANSETRON 4 MG/1
4 TABLET, FILM COATED ORAL EVERY 8 HOURS PRN
Qty: 15 TABLET | Refills: 0 | Status: SHIPPED | OUTPATIENT
Start: 2024-01-27 | End: 2024-01-27

## 2024-01-27 RX ORDER — PANTOPRAZOLE SODIUM 40 MG/1
40 TABLET, DELAYED RELEASE ORAL DAILY
Qty: 20 TABLET | Refills: 0 | Status: SHIPPED | OUTPATIENT
Start: 2024-01-27 | End: 2024-01-31 | Stop reason: SDUPTHER

## 2024-01-27 RX ADMIN — ALUMINUM HYDROXIDE, MAGNESIUM HYDROXIDE, AND SIMETHICONE 30 ML: 200; 200; 20 SUSPENSION ORAL at 18:57

## 2024-01-27 RX ADMIN — MORPHINE SULFATE 2 MG: 2 INJECTION, SOLUTION INTRAMUSCULAR; INTRAVENOUS at 19:58

## 2024-01-27 RX ADMIN — ONDANSETRON 4 MG: 2 INJECTION INTRAMUSCULAR; INTRAVENOUS at 19:23

## 2024-01-27 RX ADMIN — PANTOPRAZOLE SODIUM 40 MG: 40 INJECTION, POWDER, FOR SOLUTION INTRAVENOUS at 18:20

## 2024-01-27 RX ADMIN — KETOROLAC TROMETHAMINE 15 MG: 30 INJECTION, SOLUTION INTRAMUSCULAR; INTRAVENOUS at 19:29

## 2024-01-27 NOTE — ED PROCEDURE NOTE
PROCEDURE  ECG 12 Lead Documentation Only    Date/Time: 1/27/2024 6:02 PM    Performed by: Prieto Zamudio MD  Authorized by: Prieto Zamudio MD    Indications / Diagnosis:  Cp  ECG reviewed by me, the ED Provider: yes    Patient location:  ED  Interpretation:     Interpretation: normal    Rate:     ECG rate:  85    ECG rate assessment: normal    Rhythm:     Rhythm: sinus rhythm    Ectopy:     Ectopy: none    QRS:     QRS axis:  Normal    QRS intervals:  Normal  Conduction:     Conduction: normal    ST segments:     ST segments:  Normal  T waves:     T waves: normal         Prieto Zamudio MD  01/27/24 1800

## 2024-01-27 NOTE — ED PROVIDER NOTES
History  Chief Complaint   Patient presents with    Chest Pain     Chest and upper abd pain since this morning     33-year-old male history of GERD, psoriasis, anxiety who presents to the emergency department with epigastric abdominal pain for approximately 5 hours.  Patient states he was sleeping the pain woke him from sleep.  Patient notes associated nausea and vomiting.  Patient states last night he had 5 flights of beer as well as sandwich.  Patient states he dieting spicy.  Patient states he has a history of GERD and took Prilosec without relief.  Patient states he also took Tums without relief.  Patient denies hematemesis, fever, chills, shortness of breath, cough, diarrhea, blood in stool.  Patient states pain in abdomen radiates into his chest and describes it as a burning sensation.      History provided by:  Patient      Prior to Admission Medications   Prescriptions Last Dose Informant Patient Reported? Taking?   Ascorbic Acid, Vitamin C, (VITAMIN C) 100 MG tablet   Yes No   Sig: Take by mouth daily   clobetasol (TEMOVATE) 0.05 % ointment  Self Yes No   Sig: APPLY TWICE A DAY AS NEEDED PSORIASIS   Patient not taking: Reported on 9/7/2023   diclofenac (VOLTAREN) 75 mg EC tablet   Yes No   Sig: Take 75 mg by mouth   escitalopram (LEXAPRO) 10 mg tablet   No No   Sig: Take 1 tablet (10 mg total) by mouth daily Take one 10mg tablet for one week. Then, increase to 20mg for one week (may take one 20mg tablet) Then, increase to 30mg total for the remainder of prescription (take one 10mg tablet and one 20mg tablet)   escitalopram (LEXAPRO) 20 mg tablet   No No   Sig: TAKE 1 TABLET (20 MG TOTAL) BY MOUTH DAILY TAKE ONE 10MG TABLET FOR ONE WEEK. THEN, INCREASE TO 20MG FOR ONE WEEK (MAY TAKE ONE 20MG TABLET) THEN, INCREASE TO 30MG TOTAL FOR THE REMAINDER OF PRESCRIPTION (TAKE ONE 10MG TABLET AND ONE 20MG TABLET)   guselkumab (Tremfya) subcutaneous injection   Yes No   triamcinolone (KENALOG) 0.1 % cream   Yes Yes       Facility-Administered Medications: None       Past Medical History:   Diagnosis Date    Anxiety     Childhood asthma     Depression     GERD (gastroesophageal reflux disease)     Psoriasis     last assessed 9/4/14       Past Surgical History:   Procedure Laterality Date    HERNIA REPAIR         Family History   Problem Relation Age of Onset    Psoriasis Father     Diabetes Paternal Grandfather     Heart disease Paternal Grandfather     Depression Mother      I have reviewed and agree with the history as documented.    E-Cigarette/Vaping    E-Cigarette Use Never User      E-Cigarette/Vaping Substances     Social History     Tobacco Use    Smoking status: Former     Current packs/day: 0.00     Average packs/day: 0.3 packs/day for 20.3 years (5.1 ttl pk-yrs)     Types: Cigarettes     Start date: 2000     Quit date: 4/9/2020     Years since quitting: 3.8    Smokeless tobacco: Never    Tobacco comments:     2-3 cig per day, just started back up a few days ago   Vaping Use    Vaping status: Never Used   Substance Use Topics    Alcohol use: Yes     Comment: Once a month    Drug use: No        Review of Systems   Constitutional:  Negative for chills and fever.   HENT:  Negative for ear pain and sore throat.    Eyes:  Negative for pain and visual disturbance.   Respiratory:  Negative for cough and shortness of breath.    Cardiovascular:  Positive for chest pain. Negative for palpitations.   Gastrointestinal:  Positive for abdominal pain, nausea and vomiting. Negative for blood in stool, constipation and diarrhea.   Genitourinary:  Negative for dysuria and hematuria.   Musculoskeletal:  Negative for arthralgias and back pain.   Skin:  Negative for color change and rash.   Neurological:  Negative for seizures and syncope.   All other systems reviewed and are negative.      Physical Exam  ED Triage Vitals   Temperature Pulse Respirations Blood Pressure SpO2   01/27/24 1800 01/27/24 1800 01/27/24 1800 01/27/24 1800 01/27/24  1800   98.8 °F (37.1 °C) 84 20 151/95 98 %      Temp Source Heart Rate Source Patient Position - Orthostatic VS BP Location FiO2 (%)   01/27/24 1800 01/27/24 1800 01/27/24 1800 01/27/24 1800 --   Tympanic Monitor Lying Left arm       Pain Score       01/27/24 1929       8             Orthostatic Vital Signs  Vitals:    01/27/24 1830 01/27/24 1900 01/27/24 1930 01/27/24 2000   BP: 106/73 117/77 101/58 114/85   Pulse: 75 68 60 70   Patient Position - Orthostatic VS: Lying Lying Lying Lying       Physical Exam  Vitals and nursing note reviewed.   Constitutional:       General: He is not in acute distress.     Appearance: He is well-developed.   HENT:      Head: Normocephalic and atraumatic.   Eyes:      Conjunctiva/sclera: Conjunctivae normal.   Cardiovascular:      Rate and Rhythm: Normal rate and regular rhythm.      Heart sounds: No murmur heard.  Pulmonary:      Effort: Pulmonary effort is normal. No respiratory distress.      Breath sounds: Normal breath sounds.   Abdominal:      Palpations: Abdomen is soft.      Tenderness: There is abdominal tenderness in the right upper quadrant and epigastric area. There is no guarding or rebound.   Musculoskeletal:         General: No swelling.      Cervical back: Neck supple.   Skin:     General: Skin is warm and dry.      Capillary Refill: Capillary refill takes less than 2 seconds.   Neurological:      Mental Status: He is alert.   Psychiatric:         Mood and Affect: Mood normal.         ED Medications  Medications   pantoprazole (PROTONIX) injection 40 mg (40 mg Intravenous Given 1/27/24 1820)   aluminum-magnesium hydroxide-simethicone (MAALOX) oral suspension 30 mL (30 mL Oral Given 1/27/24 1857)   ondansetron (ZOFRAN) injection 4 mg (4 mg Intravenous Given 1/27/24 1923)   ketorolac (TORADOL) injection 15 mg (15 mg Intravenous Given 1/27/24 1929)   morphine injection 2 mg (2 mg Intravenous Given 1/27/24 1958)       Diagnostic Studies  Results Reviewed       Procedure  Component Value Units Date/Time    Comprehensive metabolic panel [[id number]]  (Abnormal) Collected: 01/27/24 1813    Lab Status: Final result Specimen: Blood from Arm, Left Updated: 01/27/24 1837     Sodium 137 mmol/L      Potassium 3.9 mmol/L      Chloride 101 mmol/L      CO2 28 mmol/L      ANION GAP 8 mmol/L      BUN 17 mg/dL      Creatinine 0.85 mg/dL      Glucose 105 mg/dL      Calcium 10.0 mg/dL      AST 45 U/L      ALT 94 U/L      Alkaline Phosphatase 65 U/L      Total Protein 8.0 g/dL      Albumin 4.6 g/dL      Total Bilirubin 0.39 mg/dL      eGFR 113 ml/min/1.73sq m     Narrative:      National Kidney Disease Foundation guidelines for Chronic Kidney Disease (CKD):     Stage 1 with normal or high GFR (GFR > 90 mL/min/1.73 square meters)    Stage 2 Mild CKD (GFR = 60-89 mL/min/1.73 square meters)    Stage 3A Moderate CKD (GFR = 45-59 mL/min/1.73 square meters)    Stage 3B Moderate CKD (GFR = 30-44 mL/min/1.73 square meters)    Stage 4 Severe CKD (GFR = 15-29 mL/min/1.73 square meters)    Stage 5 End Stage CKD (GFR <15 mL/min/1.73 square meters)  Note: GFR calculation is accurate only with a steady state creatinine    Lipase [[id number]]  (Normal) Collected: 01/27/24 1813    Lab Status: Final result Specimen: Blood from Arm, Left Updated: 01/27/24 1837     Lipase 11 u/L     CBC and differential [[id number]] Collected: 01/27/24 1813    Lab Status: Final result Specimen: Blood from Arm, Left Updated: 01/27/24 1819     WBC 7.90 Thousand/uL      RBC 5.25 Million/uL      Hemoglobin 15.7 g/dL      Hematocrit 46.2 %      MCV 88 fL      MCH 29.9 pg      MCHC 34.0 g/dL      RDW 12.5 %      MPV 9.4 fL      Platelets 309 Thousands/uL      nRBC 0 /100 WBCs      Neutrophils Relative 63 %      Immat GRANS % 0 %      Lymphocytes Relative 25 %      Monocytes Relative 9 %      Eosinophils Relative 2 %      Basophils Relative 1 %      Neutrophils Absolute 4.98 Thousands/µL      Immature Grans Absolute 0.02 Thousand/uL       Lymphocytes Absolute 1.95 Thousands/µL      Monocytes Absolute 0.70 Thousand/µL      Eosinophils Absolute 0.19 Thousand/µL      Basophils Absolute 0.06 Thousands/µL                    No orders to display         Procedures  ECG 12 Lead Documentation Only    Date/Time: 1/27/2024 8:49 PM    Performed by: Marlin Melgar DO  Authorized by: Marlin Melgar DO    Patient location:  ED  Previous ECG:     Previous ECG:  Compared to current  Interpretation:     Interpretation: normal    Rate:     ECG rate assessment: normal    Rhythm:     Rhythm: sinus rhythm    Ectopy:     Ectopy: none    QRS:     QRS axis:  Normal    QRS intervals:  Normal  Conduction:     Conduction: normal    ST segments:     ST segments:  Normal  T waves:     T waves: normal          ED Course  ED Course as of 01/27/24 2058   Sat Jan 27, 2024 1829 CBC and differential  No leukocytosis leukopenia.  Hemoglobin hematocrit within normal limits.  Platelets within normal limits.  Reassuring differential.   1843 Comprehensive metabolic panel(!)  Electrolytes within normal limits.  Kidney function testing within normal is.  Slightly elevated AST and ALT, with normal T. bili and alk phos unlikely cholecystitis or other liver pathology.  Will have patient follow-up with primary care doctor for these lab findings.   1845 Lipase: 11  Unlikely pancreatitis.   1931 Pain is unchanged, patient just received IV toradol. Will reassess in 15 min   1947 Patient did not have any improvement in pain, will give small dose of morphine.   2010 Feels significantly better at this time after morphine.  Will discharge home.                             SBIRT 20yo+      Flowsheet Row Most Recent Value   Initial Alcohol Screen: US AUDIT-C     1. How often do you have a drink containing alcohol? 0 Filed at: 01/27/2024 1833   2. How many drinks containing alcohol do you have on a typical day you are drinking?  0 Filed at: 01/27/2024 1833   3a. Male UNDER 65: How often do  you have five or more drinks on one occasion? 0 Filed at: 01/27/2024 1833   3b. FEMALE Any Age, or MALE 65+: How often do you have 4 or more drinks on one occassion? 0 Filed at: 01/27/2024 1833   Audit-C Score 0 Filed at: 01/27/2024 1833   JOSIAS: How many times in the past year have you...    Used an illegal drug or used a prescription medication for non-medical reasons? Never Filed at: 01/27/2024 1833                  Medical Decision Making  34 yo M presented to ED for epigastric abdominal pain. Associated symptoms: NV,abd pain radiation into chest. Exam findings: epigastric tenderness, RUQ tenderness.  Differentials diagnoses considered: Cholecystitis versus pancreatitis versus gastritis versus GERD. Patient was given Protonix, Maalox, Toradol, morphine for pain. See ED course for more details on reevaluations and lab interpretation. based on these results and H&P, symptoms are most likely due to gastritis from drinking alcohol. Results and clinical impressions were discussed with patient and family. They expressed understanding. Plan: Discharged home with a prescription for Zofran, Protonix, Maalox; instructed to follow-up with primary care doctor, instructed to return the emergency department for any worsening symptoms. This plan was also discussed with patient, who was agreeable with this plan. Patient was given the opportunity to ask questions in ED. All questions and concerns were addressed in ED.     Amount and/or Complexity of Data Reviewed  Labs: ordered. Decision-making details documented in ED Course.    Risk  OTC drugs.  Prescription drug management.          Disposition  Final diagnoses:   Epigastric abdominal pain   Nausea & vomiting   Elevated LFTs     Time reflects when diagnosis was documented in both MDM as applicable and the Disposition within this note       Time User Action Codes Description Comment    1/27/2024  7:42 PM Marlin Melgar Add [R10.13] Epigastric abdominal pain     1/27/2024   7:42 PM Marlin Melgar Add [R11.2] Nausea & vomiting     1/27/2024  8:10 PM Marlin Melgar Add [R79.89] Elevated LFTs           ED Disposition       ED Disposition   Discharge    Condition   Stable    Date/Time   Sat Jan 27, 2024 2012    Comment   Margarito Friday discharge to home/self care.                   Follow-up Information       Follow up With Specialties Details Why Contact Info Additional Information    Edelmira Virgen DO Family Medicine Schedule an appointment as soon as possible for a visit   [address]  [address]  [place] [address] [phone]       [facility] Emergency Medicine Go to  As needed, If symptoms worsen [address] [facility] [address] [phone] [facility]            Discharge Medication List as of 1/27/2024  8:12 PM        START taking these medications    Details   aluminum-magnesium hydroxide 200-200 MG/5ML suspension Take 10 mL by mouth every 6 (six) hours as needed for heartburn, Starting Sat 1/27/2024, Normal      pantoprazole (PROTONIX) 40 mg tablet Take 1 tablet (40 mg total) by mouth daily for 20 days, Starting Sat 1/27/2024, Until Fri 2/16/2024, Normal           CONTINUE these medications which have CHANGED    Details   ondansetron (Zofran) 4 mg tablet Take 1 tablet (4 mg total) by mouth every 8 (eight) hours as needed for nausea or vomiting for up to 7 days, Starting Sat 1/27/2024, Until Sat 2/3/2024 at 2359, Normal           CONTINUE these medications which have NOT CHANGED    Details   triamcinolone (KENALOG) 0.1 % cream Historical Med      Ascorbic Acid, Vitamin C, (VITAMIN C) 100 MG tablet Take by mouth daily, Historical Med      clobetasol (TEMOVATE) 0.05 % ointment APPLY TWICE A DAY AS NEEDED PSORIASIS, Historical Med      diclofenac (VOLTAREN) 75 mg EC tablet Take 75 mg by mouth, Starting Fri 12/15/2023, Until Sat 12/14/2024 at 2359, Historical Med      !!  escitalopram (LEXAPRO) 10 mg tablet Take 1 tablet (10 mg total) by mouth daily Take one 10mg tablet for one week. Then, increase to 20mg for one week (may take one 20mg tablet) Then, increase to 30mg total for the remainder of prescription (take one 10mg tablet and one 20mg tablet), [facility] Until Tue 7/2/2024, Normal      !! escitalopram (LEXAPRO) 20 mg tablet TAKE 1 TABLET (20 MG TOTAL) BY MOUTH DAILY TAKE ONE 10MG TABLET FOR ONE WEEK. THEN, INCREASE TO 20MG FOR ONE WEEK (MAY TAKE ONE 20MG TABLET) THEN, INCREASE TO 30MG TOTAL FOR THE REMAINDER OF PRESCRIPTION (TAKE ONE 10MG TABLET AND ONE 20MG TABLET), [facility], Until Wed 7/24/2024, Normal      guselkumab (Tremfya) subcutaneous injection Historical Med       !! - Potential duplicate medications found. Please discuss with provider.        No discharge procedures on file.    PDMP Review       None             ED Provider  Attending physically available and evaluated Margarito Friday. I managed the patient along with the ED Attending.    Electronically Signed by           Marlin Melgar DO  01/27/24 2058

## 2024-01-28 LAB
ATRIAL RATE: 85 BPM
P AXIS: 23 DEGREES
PR INTERVAL: 152 MS
QRS AXIS: 7 DEGREES
QRSD INTERVAL: 86 MS
QT INTERVAL: 378 MS
QTC INTERVAL: 449 MS
T WAVE AXIS: 10 DEGREES
VENTRICULAR RATE: 85 BPM

## 2024-01-29 NOTE — ED ATTENDING ATTESTATION
1/27/2024  I, Prieto Zamudio MD, saw and evaluated the patient. I have discussed the patient with the resident/non-physician practitioner and agree with the resident's/non-physician practitioner's findings, Plan of Care, and MDM as documented in the resident's/non-physician practitioner's note, except where noted. All available labs and Radiology studies were reviewed.  I was present for key portions of any procedure(s) performed by the resident/non-physician practitioner and I was immediately available to provide assistance.       At this point I agree with the current assessment done in the Emergency Department.  I have conducted an independent evaluation of this patient a history and physical is as follows:    ED Course       Patient is a 33-year-old male with a h/o GERD who presents with a 1 day h/o chest and upper abdominal pain.  Sharp constant burning pain. Took OTC meds and prilosec without relief.  Did drink alcohol the night before.  +NV, no diarrhea.  F    Vss  Heent wnl  Rrr  Cta b/l  Abd soft. +epigastric ttp, no guarding rebound.  No murphys.  Neuro intact    Plan as discussed with resident.        Critical Care Time  Procedures

## 2024-01-31 ENCOUNTER — OFFICE VISIT (OUTPATIENT)
Dept: FAMILY MEDICINE CLINIC | Facility: CLINIC | Age: 34
End: 2024-01-31
Payer: COMMERCIAL

## 2024-01-31 VITALS
BODY MASS INDEX: 32.76 KG/M2 | HEIGHT: 69 IN | HEART RATE: 77 BPM | OXYGEN SATURATION: 97 % | DIASTOLIC BLOOD PRESSURE: 80 MMHG | WEIGHT: 221.2 LBS | TEMPERATURE: 98.9 F | SYSTOLIC BLOOD PRESSURE: 106 MMHG

## 2024-01-31 DIAGNOSIS — K21.9 GASTROESOPHAGEAL REFLUX DISEASE WITHOUT ESOPHAGITIS: Primary | ICD-10-CM

## 2024-01-31 DIAGNOSIS — R79.89 ELEVATED LFTS: ICD-10-CM

## 2024-01-31 PROBLEM — R10.13 EPIGASTRIC ABDOMINAL PAIN: Status: ACTIVE | Noted: 2024-01-31

## 2024-01-31 PROCEDURE — 99213 OFFICE O/P EST LOW 20 MIN: CPT

## 2024-01-31 RX ORDER — PANTOPRAZOLE SODIUM 40 MG/1
40 TABLET, DELAYED RELEASE ORAL DAILY
Qty: 30 TABLET | Refills: 1 | Status: SHIPPED | OUTPATIENT
Start: 2024-01-31 | End: 2024-03-31

## 2024-01-31 NOTE — ASSESSMENT & PLAN NOTE
Patient with elevated LFTs in the ER. Discussed with patient that this is likely due to fatty liver disease, however we will need a formal ultrasound to assess. He states his father has fatty liver disease. Discussed diet changes including less fatty foods and decreasing alcohol intake, which he is understanding of. He will be contacted once the results of the ultrasound return.     Lab Results   Component Value Date    SODIUM 137 01/27/2024    K 3.9 01/27/2024     01/27/2024    CO2 28 01/27/2024    AGAP 8 01/27/2024    BUN 17 01/27/2024    CREATININE 0.85 01/27/2024    GLUC 105 01/27/2024    CALCIUM 10.0 01/27/2024    AST 45 (H) 01/27/2024    ALT 94 (H) 01/27/2024    ALKPHOS 65 01/27/2024    TP 8.0 01/27/2024    TBILI 0.39 01/27/2024    EGFR 113 01/27/2024

## 2024-01-31 NOTE — ASSESSMENT & PLAN NOTE
Patient presents today to discuss his reflux symptoms that led to an ER visit on Saturday. Patient states he has a history of reflux that is typically flared up from coffee, but he has not been treated for reflux before with medications.     ER gave him protonix, which he states has helped his symptoms and he has had no recurrence of his symptoms. Given refill for two months of Protonix for him to continue to take, and then I would like his symptoms to be reassessed if they return. He is likely suffering from an exacerbation of his reflux due to his diet and alcohol use. Also given handout today to proper GERD diet.     If symptoms persist off of Protonix, may need to consider GI referral for formal evaluation and possible EGD.     Patient agreeable with plan today and will follow up with us as needed. All questions were answered.

## 2024-01-31 NOTE — PROGRESS NOTES
Name: Margarito Friday      : 1990      MRN: [id number]  Encounter Provider: Cecy Yan PA-C  Encounter Date: 2024   Encounter department: [facility]    Assessment & Plan     1. Gastroesophageal reflux disease without esophagitis  Assessment & Plan:  Patient presents today to discuss his reflux symptoms that led to an ER visit on Saturday. Patient states he has a history of reflux that is typically flared up from coffee, but he has not been treated for reflux before with medications.     ER gave him protonix, which he states has helped his symptoms and he has had no recurrence of his symptoms. Given refill for two months of Protonix for him to continue to take, and then I would like his symptoms to be reassessed if they return. He is likely suffering from an exacerbation of his reflux due to his diet and alcohol use. Also given handout today to proper GERD diet.     If symptoms persist off of Protonix, may need to consider GI referral for formal evaluation and possible EGD.     Patient agreeable with plan today and will follow up with us as needed. All questions were answered.     Orders:  -     pantoprazole (PROTONIX) 40 mg tablet; Take 1 tablet (40 mg total) by mouth daily    2. Elevated LFTs  Assessment & Plan:  Patient with elevated LFTs in the ER. Discussed with patient that this is likely due to fatty liver disease, however we will need a formal ultrasound to assess. He states his father has fatty liver disease. Discussed diet changes including less fatty foods and decreasing alcohol intake, which he is understanding of. He will be contacted once the results of the ultrasound return.     Lab Results   Component Value Date    SODIUM 137 2024    K 3.9 2024     2024    CO2 28 2024    AGAP 8 2024    BUN 17 2024    CREATININE 0.85 2024    GLUC 105 2024    CALCIUM 10.0 2024    AST 45 (H) 2024    ALT 94 (H)  01/27/2024    ALKPHOS 65 01/27/2024    TP 8.0 01/27/2024    TBILI 0.39 01/27/2024    EGFR 113 01/27/2024       Orders:  -     US right upper quadrant; Future; Expected date: 01/31/2024           Subjective      Patient presents today for an ER follow up for reflux. He was seen on 1/27 in the ER with epigastric pain, which was determined to be GERD. He was given Protonix, which he states has helped his symptoms and he has had no recurrence of his symptoms.     His symptoms leading up to his ER visit were burning epigastric and chest pain on Saturday, which then developed into vomiting. He states he did have beers and sours on Friday and believes this to be the cause of his symptoms. He states the only medication that took care of his pain was morphine given in the ER.         Review of Systems   Constitutional:  Negative for chills, fatigue and fever.   Respiratory:  Negative for cough, chest tightness and shortness of breath.    Cardiovascular:  Negative for chest pain, palpitations and leg swelling.   Gastrointestinal:  Negative for abdominal pain, constipation, diarrhea, nausea and vomiting.   Neurological:  Negative for dizziness, light-headedness and headaches.   Psychiatric/Behavioral:  Negative for behavioral problems. The patient is not nervous/anxious.        Current Outpatient Medications on File Prior to Visit   Medication Sig    aluminum-magnesium hydroxide 200-200 MG/5ML suspension Take 10 mL by mouth every 6 (six) hours as needed for heartburn    Ascorbic Acid, Vitamin C, (VITAMIN C) 100 MG tablet Take by mouth daily    diclofenac (VOLTAREN) 75 mg EC tablet Take 75 mg by mouth    escitalopram (LEXAPRO) 10 mg tablet Take 1 tablet (10 mg total) by mouth daily Take one 10mg tablet for one week. Then, increase to 20mg for one week (may take one 20mg tablet) Then, increase to 30mg total for the remainder of prescription (take one 10mg tablet and one 20mg tablet)    escitalopram (LEXAPRO) 20 mg tablet TAKE 1  "TABLET (20 MG TOTAL) BY MOUTH DAILY TAKE ONE 10MG TABLET FOR ONE WEEK. THEN, INCREASE TO 20MG FOR ONE WEEK (MAY TAKE ONE 20MG TABLET) THEN, INCREASE TO 30MG TOTAL FOR THE REMAINDER OF PRESCRIPTION (TAKE ONE 10MG TABLET AND ONE 20MG TABLET)    guselkumab (Tremfya) subcutaneous injection     ondansetron (Zofran) 4 mg tablet Take 1 tablet (4 mg total) by mouth every 8 (eight) hours as needed for nausea or vomiting for up to 7 days    triamcinolone (KENALOG) 0.1 % cream     [DISCONTINUED] pantoprazole (PROTONIX) 40 mg tablet Take 1 tablet (40 mg total) by mouth daily for 20 days    clobetasol (TEMOVATE) 0.05 % ointment APPLY TWICE A DAY AS NEEDED PSORIASIS (Patient not taking: Reported on 9/7/2023)       Objective     /80 (BP Location: Left arm, Patient Position: Sitting, Cuff Size: Large)   Pulse 77   Temp 98.9 °F (37.2 °C)   Ht 5' 9.29\" (1.76 m)   Wt 100 kg (221 lb 3.2 oz)   SpO2 97%   BMI 32.39 kg/m²     Physical Exam  Constitutional:       General: He is not in acute distress.     Appearance: Normal appearance. He is not ill-appearing or diaphoretic.   HENT:      Head: Normocephalic and atraumatic.   Cardiovascular:      Rate and Rhythm: Normal rate and regular rhythm.      Heart sounds: No murmur heard.  Pulmonary:      Effort: Pulmonary effort is normal. No respiratory distress.      Breath sounds: No stridor. No wheezing, rhonchi or rales.   Abdominal:      General: Bowel sounds are normal. There is no distension.      Palpations: Abdomen is soft.      Tenderness: There is no abdominal tenderness. There is no guarding or rebound.   Musculoskeletal:      Right lower leg: No edema.      Left lower leg: No edema.   Skin:     Coloration: Skin is not cyanotic or pale.   Neurological:      General: No focal deficit present.      Mental Status: He is alert and oriented to person, place, and time.   Psychiatric:         Mood and Affect: Mood normal.         Behavior: Behavior normal. Behavior is cooperative.  "        Judgment: Judgment normal.       Cecy Yan PA-C

## 2024-02-28 DIAGNOSIS — K21.9 GASTROESOPHAGEAL REFLUX DISEASE WITHOUT ESOPHAGITIS: ICD-10-CM

## 2024-02-28 RX ORDER — PANTOPRAZOLE SODIUM 40 MG/1
40 TABLET, DELAYED RELEASE ORAL DAILY
Qty: 90 TABLET | Refills: 1 | Status: SHIPPED | OUTPATIENT
Start: 2024-02-28

## 2024-03-21 ENCOUNTER — TELEPHONE (OUTPATIENT)
Dept: FAMILY MEDICINE CLINIC | Facility: CLINIC | Age: 34
End: 2024-03-21

## 2024-03-21 ENCOUNTER — TELEPHONE (OUTPATIENT)
Age: 34
End: 2024-03-21

## 2024-03-21 NOTE — TELEPHONE ENCOUNTER
Pt returning missed call. As per Cecy's message she recommended he go to the ER as he was vomiting blood, he stated he thought it was a GERD flare up and has seen Cecy in the past for this. Attempted to warm transfer pt to [facility] to triage, call dropped. She will give him a call back to further triage.

## 2024-03-21 NOTE — TELEPHONE ENCOUNTER
----- Message from Cecy Yan PA-C sent at 3/21/2024 12:44 PM EDT -----  Regarding: appt tomorrow  This patient is scheduled with me for tomorrow and the visit notes are listed that he is vomiting blood. I would recommend the ER if he is vomiting blood, this is not appropriate for outpatient management. Please call the patient and let him know of my recommendations, thank you!

## 2024-04-08 DIAGNOSIS — F42.2 MIXED OBSESSIONAL THOUGHTS AND ACTS: ICD-10-CM

## 2024-04-08 DIAGNOSIS — F41.1 GAD (GENERALIZED ANXIETY DISORDER): ICD-10-CM

## 2024-04-08 DIAGNOSIS — F33.1 MODERATE EPISODE OF RECURRENT MAJOR DEPRESSIVE DISORDER (HCC): ICD-10-CM

## 2024-04-08 RX ORDER — ESCITALOPRAM OXALATE 20 MG/1
20 TABLET ORAL DAILY
Qty: 90 TABLET | Refills: 1 | Status: SHIPPED | OUTPATIENT
Start: 2024-04-08 | End: 2024-10-05

## 2024-04-08 RX ORDER — ESCITALOPRAM OXALATE 10 MG/1
10 TABLET ORAL DAILY
Qty: 90 TABLET | Refills: 1 | Status: SHIPPED | OUTPATIENT
Start: 2024-04-08 | End: 2024-10-05

## 2024-05-02 DIAGNOSIS — F42.2 MIXED OBSESSIONAL THOUGHTS AND ACTS: ICD-10-CM

## 2024-05-03 RX ORDER — ESCITALOPRAM OXALATE 10 MG/1
10 TABLET ORAL DAILY
Qty: 270 TABLET | Refills: 1 | Status: SHIPPED | OUTPATIENT
Start: 2024-05-03 | End: 2024-10-30

## 2024-07-22 DIAGNOSIS — F33.1 MODERATE EPISODE OF RECURRENT MAJOR DEPRESSIVE DISORDER (HCC): ICD-10-CM

## 2024-07-22 DIAGNOSIS — F41.1 GAD (GENERALIZED ANXIETY DISORDER): ICD-10-CM

## 2024-07-22 DIAGNOSIS — F42.2 MIXED OBSESSIONAL THOUGHTS AND ACTS: ICD-10-CM

## 2024-07-23 RX ORDER — ESCITALOPRAM OXALATE 10 MG/1
10 TABLET ORAL DAILY
Qty: 30 TABLET | Refills: 5 | Status: SHIPPED | OUTPATIENT
Start: 2024-07-23 | End: 2025-01-19

## 2024-07-23 RX ORDER — ESCITALOPRAM OXALATE 20 MG/1
20 TABLET ORAL DAILY
Qty: 90 TABLET | Refills: 0 | Status: SHIPPED | OUTPATIENT
Start: 2024-07-23 | End: 2025-01-19

## 2024-08-16 ENCOUNTER — TELEPHONE (OUTPATIENT)
Age: 34
End: 2024-08-16

## 2024-08-16 NOTE — TELEPHONE ENCOUNTER
Contacted patient off of Medication Management  to verify needs of services in attempts to offer patient an appointment. LVM for patient to contact intake dept  in regards to an appointment. 1st attempt.

## 2024-08-16 NOTE — TELEPHONE ENCOUNTER
"Behavioral Health Outpatient Intake Questions    Referred By   : Self    Please advise interviewee that they need to answer all questions truthfully to allow for best care, and any misrepresentations of information may affect their ability to be seen at this clinic   => Was this discussed? Yes     Behavioral Health Outpatient Intake History -     Presenting Problem (in patient's own words): Continue medication    Are there any communication barriers for this patient?     No                                                 Are you taking any psychiatric medications? Yes     If \"YES\" -What are they Lexapro     If \"YES\" -Who prescribes? PCP    Has the Patient previously received outpatient Talk Therapy or Medication Management from [facility]  Yes        If \"YES\"- When, Where and with Whom? Pt seem for med mgmt almost a year ago, domenica Agrawal      Has the Patient abused alcohol or other substances in the last 6 months ? No  No concerns of substance abuse are reported.    Legal History-     Is this treatment court ordered? No   If \"yes \"send to :  Talk Therapy : Send to Joshua Pak/Jacqueline Camara for final determination   Med Management: Send to Dr Jose for final determination     Has the Patient been convicted of a felony?  No    ACCEPTED as a patient Yes  If \"Yes\" Appointment Date: NP appt with Jennifer SMITH 11/1 at 2:30 pm  NP packet sent via [employer]  Pt shared would prefer to have virtual appts    Referred Elsewhere? No    Name of Insurance Co:[employer]  Insurance ID#[id number]   Insurance Phone #  If ins is primary or secondary?Primary  "

## 2024-08-22 ENCOUNTER — TELEPHONE (OUTPATIENT)
Dept: PSYCHIATRY | Facility: CLINIC | Age: 34
End: 2024-08-22

## 2024-08-22 NOTE — TELEPHONE ENCOUNTER
Forms sent to pt's [employer]    Spoke with pt. He is aware that the forms need to be completed prior to his appt.

## 2024-09-21 DIAGNOSIS — F42.2 MIXED OBSESSIONAL THOUGHTS AND ACTS: ICD-10-CM

## 2024-09-23 RX ORDER — ESCITALOPRAM OXALATE 10 MG/1
10 TABLET ORAL DAILY
Qty: 90 TABLET | Refills: 1 | Status: SHIPPED | OUTPATIENT
Start: 2024-09-23 | End: 2025-03-22

## 2024-10-03 ENCOUNTER — OFFICE VISIT (OUTPATIENT)
Dept: URGENT CARE | Facility: CLINIC | Age: 34
End: 2024-10-03
Payer: COMMERCIAL

## 2024-10-03 VITALS
BODY MASS INDEX: 31.34 KG/M2 | RESPIRATION RATE: 18 BRPM | TEMPERATURE: 97.3 F | OXYGEN SATURATION: 100 % | WEIGHT: 214 LBS | HEART RATE: 82 BPM

## 2024-10-03 DIAGNOSIS — B35.6 JOCK ITCH: Primary | ICD-10-CM

## 2024-10-03 PROCEDURE — 99213 OFFICE O/P EST LOW 20 MIN: CPT | Performed by: NURSE PRACTITIONER

## 2024-10-03 RX ORDER — NYSTATIN 100000 [USP'U]/G
POWDER TOPICAL 3 TIMES DAILY
Qty: 60 G | Refills: 1 | Status: SHIPPED | OUTPATIENT
Start: 2024-10-03

## 2024-10-03 RX ORDER — OMEPRAZOLE 40 MG/1
40 CAPSULE, DELAYED RELEASE ORAL 2 TIMES DAILY
COMMUNITY
Start: 2024-08-22

## 2024-10-03 NOTE — PATIENT INSTRUCTIONS
"Patient Education     Ringworm, athlete's foot, and jock itch   The Basics   Written by the doctors and editors at [facility]   What are ringworm, athlete's foot, and jock itch? -- These are all skin infections caused by a fungus. These types of fungal infections are also called \"tinea.\"  Some people call these fungal infections \"ringworm.\" This is because they often cause a ring-shaped, red, itchy rash on the skin (picture 1). But a ring-shaped rash is not always there. Depending on where the infection is, it can look different:   People with athlete's foot might instead have moist, raw skin between their toes, or flaking skin on the bottoms of their feet (picture 2 and picture 3).   People with jock itch often just have a rash on their groin. It usually starts in the fold where the thigh meets the groin area .   Sometimes, especially in children, the fungus can infect the scalp. On the scalp, the infection can look like a bald spot or a round flaky patch of skin (picture 5 and picture 6).  How did I get a fungal infection? -- You can catch fungal infections through skin-to-skin contact with a person who is infected. You can also catch them from an infected dog or cat.  You can also get the infections from places where the fungus might be, such as:   A shower stall   A locker room floor   The area near a pool  If you have a fungal infection on 1 part of your body, you can also spread it to other parts. For instance, a fungal infection on your feet could spread to your groin.  How are fungal infections treated? -- The treatment for a fungal infection depends on which body part is affected:   If you have a fungal infection on your scalp, you must take pills to kill the fungus. Treatment for scalp infections usually lasts 1 to 3 months.   If you have a fungal infection on your feet, groin, or another body part, most of the time, you will not need pills. Instead, you can use a special gel, cream, lotion, or powder to " kill the fungus. Treatment with these products lasts 2 to 4 weeks.   If you have a fungal infection on your groin and on your feet, you must treat both infections at the same time. If you don't, the infection on your feet can spread to your groin again.  What problems should I watch for? -- If you are being treated for a fungal infection, call your doctor or nurse for advice if:   Your fungal infection spreads.   You have any of the following symptoms:   Fever of 100.4°F (38°C) or higher   Chills   Swelling, redness, or warmth around the infected area   Pain when touching the area   Your fungal infection doesn't go away after treatment.  How do I keep from getting a fungal infection again? -- If someone in your home has had a fungal infection on their scalp:   Get rid of any castillo, brushes, barrettes, or other hair products that could have the fungus on them.   Make sure that a doctor or nurse checks everyone in the house for a fungal infection on the scalp. The doctor or nurse might also suggest that everyone else in the house use an antifungal shampoo for a few weeks.   If the fungal infection might have come from a pet, have the pet checked by a vet.  Some other general tips to prevent fungal infections:   Do not share unwashed clothes, sports gear, or towels with other people.   Always wear slippers or sandals when at the gym, pool, or other public areas. This includes public showers.   Change your socks and underwear at least once a day.   Keep your skin clean and dry. Always dry yourself well after swimming or showering.  All topics are updated as new evidence becomes available and our peer review process is complete.  This topic retrieved from [employer] on: May 16, 2024.  Topic 39216 Version 11.0  Release: 32.4.3 - C32.135  © 2024 UpToDate, Inc. and/or its affiliates. All rights reserved.  picture 1: Ringworm     Ringworm can cause a ring-shaped, red, itchy rash on the skin.  Reproduced with permission from:  www.[employer]ight. Copyright [employer]. All rights reserved.  Graphic 606811 Version 2.0  picture 2: Athlete's foot     Athlete's foot can cause moist, raw skin between the toes.  Reproduced with permission from: www.[employer]ight. Copyright [employer]. All rights reserved.  Graphic 494441 Version 2.0  picture 3: Athlete's foot     People with athlete's foot often have flaky skin on the bottoms of their feet.  Graphic 251785 Version 1.0  picture 5: Fungal infection of the scalp     This photo shows tinea capitis, a fungal infection of the scalp.  Reproduced with permission from: www.skinsight.Stylesight. Copyright [employer]. All rights reserved.  Graphic 449108 Version 3.0  picture 6: Fungal infection of the scalp     A fungal infection on the scalp can cause scaly patches and hair loss.  Reproduced with permission from: www.[employer]. Copyright [employer]. All rights reserved.  Graphic 118763 Version 2.0  Consumer Information Use and Disclaimer   Disclaimer: This generalized information is a limited summary of diagnosis, treatment, and/or medication information. It is not meant to be comprehensive and should be used as a tool to help the user understand and/or assess potential diagnostic and treatment options. It does NOT include all information about conditions, treatments, medications, side effects, or risks that may apply to a specific patient. It is not intended to be medical advice or a substitute for the medical advice, diagnosis, or treatment of a health care provider based on the health care provider's examination and assessment of a patient's specific and unique circumstances. Patients must speak with a health care provider for complete information about their health, medical questions, and treatment options, including any risks or benefits regarding use of medications. This information does not endorse any treatments or medications as safe, effective, or approved for treating a specific patient. UpToDate, Inc. and  its affiliates disclaim any warranty or liability relating to this information or the use thereof.The use of this information is governed by the Terms of Use, available at https://www.wolterskluwer.com/en/know/clinical-effectiveness-terms. 2024© [employer] and its affiliates and/or licensors. All rights reserved.  Copyright   © 2024 [employer] and/or its affiliates. All rights reserved.

## 2024-10-03 NOTE — PROGRESS NOTES
[facility]        NAME: Margarito Coyne is a 34 y.o. male  : 1990    MRN: [id number]  DATE: October 3, 2024  TIME: 5:37 PM    Assessment and Plan   Jock itch [B35.6]  1. Jock itch  nystatin (MYCOSTATIN) powder      Nystatin powder sent to pharmacy   Schedule f/u with pcp in 3-4 weeks to assure resolution   Pt in agreement with plan of care      Patient Instructions     Follow up with PCP in 3-5 days.  Proceed to  ER if symptoms worsen.    Chief Complaint     Chief Complaint   Patient presents with    Rash     Patient c/o jock itch for a few months.  Has tried powder and cream with no relief         History of Present Illness   Margarito Friday presents to the clinic c/o    Rash (Patient c/o jock itch for a few months.  Has tried powder and cream with no relief)  Used gold bond powder, gold bond medicated, and jock itch cream with no improvement   Showers after work and puts on basketball shorts   Tries to keep clean and dry and moist free.        Review of Systems   Review of Systems   All other systems reviewed and are negative.        Current Medications     Long-Term Medications   Medication Sig Dispense Refill    Ascorbic Acid, Vitamin C, (VITAMIN C) 100 MG tablet Take by mouth daily      escitalopram (LEXAPRO) 10 mg tablet TAKE 1 TABLET (10 MG TOTAL) BY MOUTH DAILY TAKE ONE 10MG TABLET FOR ONE WEEK. THEN, INCREASE TO 20MG FOR ONE WEEK (MAY TAKE ONE 20MG TABLET) THEN, INCREASE TO 30MG TOTAL FOR THE REMAINDER OF PRESCRIPTION (TAKE ONE 10MG TABLET AND ONE 20MG TABLET) 90 tablet 1    escitalopram (LEXAPRO) 20 mg tablet Take 1 tablet (20 mg total) by mouth daily Take one 10mg tablet for one week. Then, increase to 20mg for one week (may take one 20mg tablet) Then, increase to 30mg total for the remainder of prescription (take one 10mg tablet and one 20mg tablet) 90 tablet 0    nystatin (MYCOSTATIN) powder Apply topically 3 (three) times a day 60 g 1    triamcinolone (KENALOG) 0.1 % cream       clobetasol  (TEMOVATE) 0.05 % ointment APPLY TWICE A DAY AS NEEDED PSORIASIS (Patient not taking: Reported on 9/7/2023)      diclofenac (VOLTAREN) 75 mg EC tablet Take 75 mg by mouth (Patient not taking: Reported on 10/3/2024)      ondansetron (Zofran) 4 mg tablet Take 1 tablet (4 mg total) by mouth every 8 (eight) hours as needed for nausea or vomiting for up to 7 days 15 tablet 0    pantoprazole (PROTONIX) 40 mg tablet TAKE 1 TABLET BY MOUTH EVERY DAY (Patient not taking: Reported on 10/3/2024) 90 tablet 1       Current Allergies     Allergies as of 10/03/2024 - Reviewed 10/03/2024   Allergen Reaction Noted    Pollen extract Allergic Rhinitis 05/20/2024            The following portions of the patient's history were reviewed and updated as appropriate: allergies, current medications, past family history, past medical history, past social history, past surgical history and problem list.    Objective   Pulse 82   Temp (!) 97.3 °F (36.3 °C) (Tympanic)   Resp 18   Wt 97.1 kg (214 lb)   SpO2 100%   BMI 31.34 kg/m²        Physical Exam     Physical Exam  Vitals and nursing note reviewed.   Constitutional:       Appearance: Normal appearance. He is well-developed.   HENT:      Head: Normocephalic and atraumatic.   Eyes:      General: Lids are normal.      Conjunctiva/sclera: Conjunctivae normal.      Pupils: Pupils are equal, round, and reactive to light.   Cardiovascular:      Rate and Rhythm: Normal rate and regular rhythm.      Heart sounds: Normal heart sounds, S1 normal and S2 normal.   Pulmonary:      Effort: Pulmonary effort is normal.      Breath sounds: Normal breath sounds.   Skin:     General: Skin is warm and dry.      Findings: Rash present. Rash is macular.          Neurological:      Mental Status: He is alert.   Psychiatric:         Speech: Speech normal.         Behavior: Behavior normal.         Thought Content: Thought content normal.         Judgment: Judgment normal.

## 2024-10-24 DIAGNOSIS — F42.2 MIXED OBSESSIONAL THOUGHTS AND ACTS: ICD-10-CM

## 2024-10-24 RX ORDER — ESCITALOPRAM OXALATE 10 MG/1
10 TABLET ORAL DAILY
Qty: 270 TABLET | Refills: 0 | Status: SHIPPED | OUTPATIENT
Start: 2024-10-24 | End: 2025-07-21

## 2024-10-31 ENCOUNTER — TELEPHONE (OUTPATIENT)
Age: 34
End: 2024-10-31

## 2024-10-31 NOTE — TELEPHONE ENCOUNTER
Patient called asking if he could have orders for blood work prior to his physical appointment in December.  Specifically he would like to have his Vit B levels check due to fatigue.

## 2024-11-01 DIAGNOSIS — Z13.0 SCREENING FOR IRON DEFICIENCY ANEMIA: ICD-10-CM

## 2024-11-01 DIAGNOSIS — Z13.220 SCREENING FOR CHOLESTEROL LEVEL: ICD-10-CM

## 2024-11-01 DIAGNOSIS — F33.1 MODERATE EPISODE OF RECURRENT MAJOR DEPRESSIVE DISORDER (HCC): ICD-10-CM

## 2024-11-01 DIAGNOSIS — Z13.29 SCREENING FOR THYROID DISORDER: ICD-10-CM

## 2024-11-01 DIAGNOSIS — F42.2 MIXED OBSESSIONAL THOUGHTS AND ACTS: Primary | ICD-10-CM

## 2024-11-01 DIAGNOSIS — R79.89 ELEVATED LFTS: ICD-10-CM

## 2024-11-01 DIAGNOSIS — Z13.1 SCREENING FOR DIABETES MELLITUS: ICD-10-CM

## 2024-11-01 NOTE — TELEPHONE ENCOUNTER
Spoke with patient. He will be going to [facility] in [place]. I also advised him to check with his insurance to make sure they will pay for the cost of the bloodwork. He said he would call.

## 2024-11-04 ENCOUNTER — OFFICE VISIT (OUTPATIENT)
Dept: FAMILY MEDICINE CLINIC | Facility: CLINIC | Age: 34
End: 2024-11-04
Payer: COMMERCIAL

## 2024-11-04 VITALS
WEIGHT: 215.8 LBS | BODY MASS INDEX: 30.9 KG/M2 | HEART RATE: 92 BPM | DIASTOLIC BLOOD PRESSURE: 80 MMHG | TEMPERATURE: 98.3 F | HEIGHT: 70 IN | SYSTOLIC BLOOD PRESSURE: 110 MMHG | OXYGEN SATURATION: 97 %

## 2024-11-04 DIAGNOSIS — R40.0 DAYTIME SOMNOLENCE: Primary | ICD-10-CM

## 2024-11-04 DIAGNOSIS — R06.83 SNORING: ICD-10-CM

## 2024-11-04 PROCEDURE — 99214 OFFICE O/P EST MOD 30 MIN: CPT | Performed by: FAMILY MEDICINE

## 2024-11-04 RX ORDER — GUSELKUMAB 100 MG/ML
INJECTION SUBCUTANEOUS
COMMUNITY
Start: 2024-10-08

## 2024-11-04 NOTE — TELEPHONE ENCOUNTER
Patient called in regards to he has contacted his insurance and they did inform him the they do cover for him to get vitamin testing.

## 2024-11-04 NOTE — PROGRESS NOTES
Ambulatory Visit  Name: Margarito Friday      : 1990      MRN: [id number]  Encounter Provider: Siddharth Brown DO  Encounter Date: 2024   Encounter department: [facility]    Assessment & Plan  Daytime somnolence  The patient symptoms today.  This time, he still has not completed his fasting blood work.  His symptoms today appear secondary to possible obstructive sleep apnea.  Given the presence of the symptoms, I will send patient for a home sleep study.  Will call patient with results.  He was advised on importance of maintaining proper sleep hygiene.  Orders:    Ambulatory Referral to Sleep Medicine; Future    Snoring    Orders:    Ambulatory Referral to Sleep Medicine; Future       History of Present Illness     HPI  Patient is a 34-year-old male presents today with CC of significant daytime fatigue/somnolence.  He has had this problem for the past few months.  Symptoms started gradually.  He states that he believes that he has been receiving proper sleep at nighttime.    History obtained from : patient  Review of Systems   Constitutional:  Negative for activity change, chills, fatigue and fever.   HENT:  Negative for congestion, ear pain, sinus pressure and sore throat.    Eyes:  Negative for redness, itching and visual disturbance.   Respiratory:  Negative for cough and shortness of breath.    Cardiovascular:  Negative for chest pain and palpitations.   Gastrointestinal:  Negative for abdominal pain, diarrhea and nausea.   Endocrine: Negative for cold intolerance and heat intolerance.   Genitourinary:  Negative for dysuria, flank pain and frequency.   Musculoskeletal:  Negative for arthralgias, back pain, gait problem and myalgias.   Skin:  Negative for color change.   Allergic/Immunologic: Negative for environmental allergies.   Neurological:  Negative for dizziness, numbness and headaches.   Psychiatric/Behavioral:  Negative for behavioral problems and sleep disturbance.       Medical History Reviewed by provider this encounter:       Current Outpatient Medications on File Prior to Visit   Medication Sig Dispense Refill    Ascorbic Acid, Vitamin C, (VITAMIN C) 100 MG tablet Take by mouth daily      escitalopram (LEXAPRO) 10 mg tablet TAKE 1 TABLET (10 MG TOTAL) BY MOUTH DAILY TAKE ONE 10MG TABLET FOR ONE WEEK. THEN, INCREASE TO 20MG FOR ONE WEEK (MAY TAKE ONE 20MG TABLET) THEN, INCREASE TO 30MG TOTAL FOR THE REMAINDER OF PRESCRIPTION (TAKE ONE 10MG TABLET AND ONE 20MG TABLET) 270 tablet 0    escitalopram (LEXAPRO) 20 mg tablet Take 1 tablet (20 mg total) by mouth daily Take one 10mg tablet for one week. Then, increase to 20mg for one week (may take one 20mg tablet) Then, increase to 30mg total for the remainder of prescription (take one 10mg tablet and one 20mg tablet) 90 tablet 0    nystatin (MYCOSTATIN) powder Apply topically 3 (three) times a day 60 g 1    omeprazole (PriLOSEC) 40 MG capsule Take 40 mg by mouth 2 (two) times a day      Tremfya 100 MG/ML SOAJ       triamcinolone (KENALOG) 0.1 % cream       aluminum-magnesium hydroxide 200-200 MG/5ML suspension Take 10 mL by mouth every 6 (six) hours as needed for heartburn (Patient not taking: Reported on 10/3/2024) 355 mL 0    clobetasol (TEMOVATE) 0.05 % ointment APPLY TWICE A DAY AS NEEDED PSORIASIS (Patient not taking: Reported on 9/7/2023)      diclofenac (VOLTAREN) 75 mg EC tablet Take 75 mg by mouth (Patient not taking: Reported on 10/3/2024)      guselkumab (Tremfya) subcutaneous injection  (Patient not taking: Reported on 11/4/2024)      ondansetron (Zofran) 4 mg tablet Take 1 tablet (4 mg total) by mouth every 8 (eight) hours as needed for nausea or vomiting for up to 7 days 15 tablet 0    pantoprazole (PROTONIX) 40 mg tablet TAKE 1 TABLET BY MOUTH EVERY DAY (Patient not taking: Reported on 10/3/2024) 90 tablet 1     No current facility-administered medications on file prior to visit.      Social History     Tobacco Use     "Smoking status: Former     Current packs/day: 0.00     Average packs/day: 0.3 packs/day for 20.3 years (5.1 ttl pk-yrs)     Types: Cigarettes     Start date:      Quit date: 2020     Years since quittin.5     Passive exposure: Never    Smokeless tobacco: Never   Vaping Use    Vaping status: Never Used   Substance and Sexual Activity    Alcohol use: Yes     Comment: Once a month    Drug use: No    Sexual activity: Yes     Partners: Female         Objective     /80 (BP Location: Left arm, Patient Position: Sitting, Cuff Size: Large)   Pulse 92   Temp 98.3 °F (36.8 °C) (Tympanic)   Ht 5' 9.5\" (1.765 m)   Wt 97.9 kg (215 lb 12.8 oz)   SpO2 97%   BMI 31.41 kg/m²     Physical Exam  Vitals reviewed.   Constitutional:       General: He is not in acute distress.     Appearance: Normal appearance. He is well-developed.   HENT:      Head: Normocephalic and atraumatic.      Right Ear: Tympanic membrane, ear canal and external ear normal. There is no impacted cerumen.      Left Ear: Tympanic membrane, ear canal and external ear normal. There is no impacted cerumen.      Nose: Nose normal. No congestion or rhinorrhea.      Mouth/Throat:      Mouth: Mucous membranes are moist.      Pharynx: No oropharyngeal exudate or posterior oropharyngeal erythema.   Eyes:      General: No scleral icterus.        Right eye: No discharge.         Left eye: No discharge.      Extraocular Movements: Extraocular movements intact.      Conjunctiva/sclera: Conjunctivae normal.      Pupils: Pupils are equal, round, and reactive to light.   Neck:      Trachea: No tracheal deviation.   Cardiovascular:      Rate and Rhythm: Normal rate and regular rhythm.      Pulses: Normal pulses.           Dorsalis pedis pulses are 2+ on the right side and 2+ on the left side.        Posterior tibial pulses are 2+ on the right side and 2+ on the left side.      Heart sounds: Normal heart sounds. No murmur heard.     No friction rub. No gallop. "   Pulmonary:      Effort: Pulmonary effort is normal. No respiratory distress.      Breath sounds: Normal breath sounds. No wheezing, rhonchi or rales.   Abdominal:      General: Bowel sounds are normal. There is no distension.      Palpations: Abdomen is soft.      Tenderness: There is no abdominal tenderness. There is no guarding or rebound.   Musculoskeletal:         General: Normal range of motion.      Cervical back: Normal range of motion and neck supple.      Right lower leg: No edema.      Left lower leg: No edema.   Lymphadenopathy:      Head:      Right side of head: No submental or submandibular adenopathy.      Left side of head: No submental or submandibular adenopathy.      Cervical: No cervical adenopathy.      Right cervical: No superficial, deep or posterior cervical adenopathy.     Left cervical: No superficial, deep or posterior cervical adenopathy.   Skin:     General: Skin is warm and dry.      Findings: No erythema.   Neurological:      General: No focal deficit present.      Mental Status: He is alert and oriented to person, place, and time.      Cranial Nerves: No cranial nerve deficit.      Sensory: Sensation is intact. No sensory deficit.      Motor: Motor function is intact.   Psychiatric:         Attention and Perception: Attention and perception normal.         Mood and Affect: Mood is not anxious or depressed.         Speech: Speech normal.         Behavior: Behavior normal.         Thought Content: Thought content normal.         Judgment: Judgment normal.

## 2024-11-05 ENCOUNTER — TELEPHONE (OUTPATIENT)
Age: 34
End: 2024-11-05

## 2024-11-05 DIAGNOSIS — R40.0 DAYTIME SOMNOLENCE: Primary | ICD-10-CM

## 2024-11-05 DIAGNOSIS — R53.82 CHRONIC FATIGUE: ICD-10-CM

## 2024-11-05 DIAGNOSIS — R06.83 SNORING: ICD-10-CM

## 2024-11-05 NOTE — TELEPHONE ENCOUNTER
Patient called to see if he can get a work excuse for this week   11/5, 11/6, 11/7, 11/8.  Return to work on 11/11.  Please call patient patient note is ready [facility] Asked if the sleep apnea test was ordered. Explained that the sleep medicine referral was placed and they will call him to set up an appt. Please follow up with patient about work note.

## 2024-11-05 NOTE — TELEPHONE ENCOUNTER
Patient requesting vitamin levels to be added to labs ordered 11/1/24.  Labs can be faxed to [facility] at [phone].    Patient also requesting an updated on work excuse letters

## 2024-11-06 LAB
25(OH)D3+25(OH)D2 SERPL-MCNC: 25 NG/ML (ref 30–100)
FERRITIN SERPL-MCNC: 234 NG/ML (ref 23.9–336.2)
VIT B12 SERPL-MCNC: 541 PG/ML (ref 180–914)

## 2024-11-08 ENCOUNTER — OFFICE VISIT (OUTPATIENT)
Dept: URGENT CARE | Age: 34
End: 2024-11-08
Payer: COMMERCIAL

## 2024-11-08 ENCOUNTER — APPOINTMENT (OUTPATIENT)
Dept: RADIOLOGY | Age: 34
End: 2024-11-08
Payer: COMMERCIAL

## 2024-11-08 VITALS
TEMPERATURE: 98 F | OXYGEN SATURATION: 98 % | SYSTOLIC BLOOD PRESSURE: 118 MMHG | RESPIRATION RATE: 18 BRPM | DIASTOLIC BLOOD PRESSURE: 74 MMHG | HEART RATE: 73 BPM

## 2024-11-08 DIAGNOSIS — M25.571 ACUTE RIGHT ANKLE PAIN: Primary | ICD-10-CM

## 2024-11-08 DIAGNOSIS — M25.571 ACUTE RIGHT ANKLE PAIN: ICD-10-CM

## 2024-11-08 PROCEDURE — 99214 OFFICE O/P EST MOD 30 MIN: CPT

## 2024-11-08 PROCEDURE — 73610 X-RAY EXAM OF ANKLE: CPT

## 2024-11-08 RX ORDER — OMEGA-3S/DHA/EPA/FISH OIL/D3 300MG-1000
400 CAPSULE ORAL DAILY
COMMUNITY

## 2024-11-08 NOTE — PROGRESS NOTES
[facility]        NAME: Margarito Frivishal is a 34 y.o. male  : 1990    MRN: [id number]  DATE: 2024  TIME: 12:04 PM    Assessment and Plan   Acute right ankle pain [M25.571]  1. Acute right ankle pain  XR ankle 3+ vw right        Twisted right ankle yesterday- pain to lateral side. Minimal swelling. No bruising., xray notes no obvious fracture.     Patient Instructions       Follow up with PCP in 3-5 days.  Proceed to  ER if symptoms worsen.    If tests have been performed at [facility], our office will contact you with results if changes need to be made to the care plan discussed with you at the visit.  [facility]    Chief Complaint     Chief Complaint   Patient presents with    Ankle Pain     Twisted right ankle yesterda         History of Present Illness       Twisted right ankle yesterday- pain to lateral side. Minimal swelling. No bruising., xray notes no obvious fracture.     Ankle Pain         Review of Systems   Review of Systems   Musculoskeletal:  Positive for arthralgias and joint swelling.   Skin:  Negative for color change.   All other systems reviewed and are negative.        Current Medications       Current Outpatient Medications:     Ascorbic Acid, Vitamin C, (VITAMIN C) 100 MG tablet, Take by mouth daily, Disp: , Rfl:     cholecalciferol (VITAMIN D3) 400 units tablet, Take 400 Units by mouth daily, Disp: , Rfl:     escitalopram (LEXAPRO) 10 mg tablet, TAKE 1 TABLET (10 MG TOTAL) BY MOUTH DAILY TAKE ONE 10MG TABLET FOR ONE WEEK. THEN, INCREASE TO 20MG FOR ONE WEEK (MAY TAKE ONE 20MG TABLET) THEN, INCREASE TO 30MG TOTAL FOR THE REMAINDER OF PRESCRIPTION (TAKE ONE 10MG TABLET AND ONE 20MG TABLET), Disp: 270 tablet, Rfl: 0    escitalopram (LEXAPRO) 20 mg tablet, Take 1 tablet (20 mg total) by mouth daily Take one 10mg tablet for one week. Then, increase to 20mg for one week (may take one 20mg tablet) Then, increase to 30mg total for the  remainder of prescription (take one 10mg tablet and one 20mg tablet), Disp: 90 tablet, Rfl: 0    omeprazole (PriLOSEC) 40 MG capsule, Take 40 mg by mouth 2 (two) times a day, Disp: , Rfl:     Tremfya 100 MG/ML SOAJ, , Disp: , Rfl:     aluminum-magnesium hydroxide 200-200 MG/5ML suspension, Take 10 mL by mouth every 6 (six) hours as needed for heartburn (Patient not taking: Reported on 10/3/2024), Disp: 355 mL, Rfl: 0    clobetasol (TEMOVATE) 0.05 % ointment, APPLY TWICE A DAY AS NEEDED PSORIASIS (Patient not taking: Reported on 9/7/2023), Disp: , Rfl:     diclofenac (VOLTAREN) 75 mg EC tablet, Take 75 mg by mouth (Patient not taking: Reported on 10/3/2024), Disp: , Rfl:     guselkumab (Tremfya) subcutaneous injection, , Disp: , Rfl:     nystatin (MYCOSTATIN) powder, Apply topically 3 (three) times a day (Patient not taking: Reported on 11/8/2024), Disp: 60 g, Rfl: 1    ondansetron (Zofran) 4 mg tablet, Take 1 tablet (4 mg total) by mouth every 8 (eight) hours as needed for nausea or vomiting for up to 7 days, Disp: 15 tablet, Rfl: 0    pantoprazole (PROTONIX) 40 mg tablet, TAKE 1 TABLET BY MOUTH EVERY DAY (Patient not taking: Reported on 10/3/2024), Disp: 90 tablet, Rfl: 1    triamcinolone (KENALOG) 0.1 % cream, , Disp: , Rfl:     Current Allergies     Allergies as of 11/08/2024 - Reviewed 11/08/2024   Allergen Reaction Noted    Pollen extract Allergic Rhinitis 05/20/2024            The following portions of the patient's history were reviewed and updated as appropriate: allergies, current medications, past family history, past medical history, past social history, past surgical history and problem list.     Past Medical History:   Diagnosis Date    Allergic     Anxiety     Childhood asthma     Depression     GERD (gastroesophageal reflux disease)     Psoriasis     last assessed 9/4/14       Past Surgical History:   Procedure Laterality Date    HERNIA REPAIR         Family History   Problem Relation Age of Onset     Psoriasis Father     Diabetes Paternal Grandfather     Heart disease Paternal Grandfather     Depression Mother          Medications have been verified.        Objective   /74   Pulse 73   Temp 98 °F (36.7 °C)   Resp 18   SpO2 98%   No LMP for male patient.       Physical Exam     Physical Exam  Vitals reviewed.   Musculoskeletal:         General: Swelling, tenderness and signs of injury present. No deformity.   Skin:     Capillary Refill: Capillary refill takes less than 2 seconds.      Findings: No bruising or erythema.

## 2024-11-15 ENCOUNTER — TRANSCRIBE ORDERS (OUTPATIENT)
Dept: SLEEP CENTER | Facility: CLINIC | Age: 34
End: 2024-11-15

## 2024-11-15 DIAGNOSIS — R40.0 DAYTIME SOMNOLENCE: Primary | ICD-10-CM

## 2024-11-15 DIAGNOSIS — R06.83 SNORING: ICD-10-CM

## 2024-11-19 DIAGNOSIS — F42.2 MIXED OBSESSIONAL THOUGHTS AND ACTS: ICD-10-CM

## 2024-11-19 DIAGNOSIS — F41.1 GAD (GENERALIZED ANXIETY DISORDER): ICD-10-CM

## 2024-11-19 DIAGNOSIS — F33.1 MODERATE EPISODE OF RECURRENT MAJOR DEPRESSIVE DISORDER (HCC): ICD-10-CM

## 2024-11-20 RX ORDER — ESCITALOPRAM OXALATE 20 MG/1
20 TABLET ORAL DAILY
Qty: 90 TABLET | Refills: 1 | Status: SHIPPED | OUTPATIENT
Start: 2024-11-20 | End: 2025-05-19

## 2024-11-20 RX ORDER — ESCITALOPRAM OXALATE 10 MG/1
10 TABLET ORAL DAILY
Qty: 90 TABLET | Refills: 1 | Status: SHIPPED | OUTPATIENT
Start: 2024-11-20 | End: 2025-05-19

## 2024-11-29 ENCOUNTER — OFFICE VISIT (OUTPATIENT)
Dept: PSYCHIATRY | Facility: CLINIC | Age: 34
End: 2024-11-29
Payer: COMMERCIAL

## 2024-11-29 DIAGNOSIS — F41.1 GAD (GENERALIZED ANXIETY DISORDER): ICD-10-CM

## 2024-11-29 DIAGNOSIS — F33.41 RECURRENT MAJOR DEPRESSIVE DISORDER, IN PARTIAL REMISSION (HCC): Primary | ICD-10-CM

## 2024-11-29 DIAGNOSIS — Z86.59 HISTORY OF OCD (OBSESSIVE COMPULSIVE DISORDER): ICD-10-CM

## 2024-11-29 DIAGNOSIS — Z79.899 ENCOUNTER FOR LONG-TERM (CURRENT) USE OF MEDICATIONS: ICD-10-CM

## 2024-11-29 PROCEDURE — 90792 PSYCH DIAG EVAL W/MED SRVCS: CPT | Performed by: PHYSICIAN ASSISTANT
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
## 2024-12-02 PROBLEM — Z86.59 HISTORY OF OCD (OBSESSIVE COMPULSIVE DISORDER): Status: ACTIVE | Noted: 2024-12-02

## 2024-12-02 PROBLEM — F41.1 GAD (GENERALIZED ANXIETY DISORDER): Status: ACTIVE | Noted: 2024-12-02

## 2024-12-02 PROBLEM — F33.41 RECURRENT MAJOR DEPRESSIVE DISORDER, IN PARTIAL REMISSION (HCC): Status: ACTIVE | Noted: 2024-01-04

## 2025-01-03 ENCOUNTER — NURSE TRIAGE (OUTPATIENT)
Age: 35
End: 2025-01-03

## 2025-01-03 ENCOUNTER — TELEMEDICINE (OUTPATIENT)
Dept: FAMILY MEDICINE CLINIC | Facility: CLINIC | Age: 35
End: 2025-01-03
Payer: COMMERCIAL

## 2025-01-03 VITALS — BODY MASS INDEX: 31.41 KG/M2 | HEIGHT: 70 IN

## 2025-01-03 DIAGNOSIS — F33.1 MODERATE EPISODE OF RECURRENT MAJOR DEPRESSIVE DISORDER (HCC): ICD-10-CM

## 2025-01-03 DIAGNOSIS — A08.4 VIRAL GASTROENTERITIS: Primary | ICD-10-CM

## 2025-01-03 DIAGNOSIS — F41.1 GAD (GENERALIZED ANXIETY DISORDER): ICD-10-CM

## 2025-01-03 DIAGNOSIS — F42.2 MIXED OBSESSIONAL THOUGHTS AND ACTS: ICD-10-CM

## 2025-01-03 PROCEDURE — 99213 OFFICE O/P EST LOW 20 MIN: CPT

## 2025-01-03 RX ORDER — ONDANSETRON 4 MG/1
4 TABLET, FILM COATED ORAL EVERY 8 HOURS PRN
Qty: 20 TABLET | Refills: 0 | Status: SHIPPED | OUTPATIENT
Start: 2025-01-03

## 2025-01-03 RX ORDER — LOPERAMIDE HYDROCHLORIDE 2 MG/1
2 TABLET ORAL 4 TIMES DAILY PRN
Qty: 30 TABLET | Refills: 0 | Status: SHIPPED | OUTPATIENT
Start: 2025-01-03

## 2025-01-03 NOTE — TELEPHONE ENCOUNTER
"Received call from patient with complaints of N/V/D x 1 week.  Symptoms started last Friday and had improved but have not resolved.  States that he is still vomiting 1-2 times daily, unable to keep down solid foods and having diarrhea and vomit the color of bile.  He denies any abdominal pain or s/sx of dehydration at this time.  His girlfriend is ill with the same symptoms.  Patient requesting virtual visit and no available appointments in the office.  Scheduled for virtual visit with Jeferson Fox today at 10:20 am.      Reason for Disposition   MILD to MODERATE vomiting (e.g., 1-5 times/day) and lasts > 48 hours (2 days)    Answer Assessment - Initial Assessment Questions  1. VOMITING SEVERITY: \"How many times have you vomited in the past 24 hours?\"       Currently 1-2 times per day.  Was worse   2. ONSET: \"When did the vomiting begin?\"       About a week ago  3. FLUIDS: \"What fluids or food have you vomited up today?\" \"Have you been able to keep any fluids down?\"      Able to keep fluids down- unable to keep solids down  4. ABDOMEN PAIN: \"Are your having any abdomen pain?\" If Yes : \"How bad is it and what does it feel like?\" (e.g., crampy, dull, intermittent, constant)       Denies  5. DIARRHEA: \"Is there any diarrhea?\" If Yes, ask: \"How many times today?\"       Yes- has not gone in the past 24 hrs  6. CONTACTS: \"Is there anyone else in the family with the same symptoms?\"       Girlfriend has same symptoms  7. CAUSE: \"What do you think is causing your vomiting?\"      Unsure  8. HYDRATION STATUS: \"Any signs of dehydration?\" (e.g., dry mouth [not only dry lips], too weak to stand) \"When did you last urinate?\"      Denies  9. OTHER SYMPTOMS: \"Do you have any other symptoms?\" (e.g., fever, headache, vertigo, vomiting blood or coffee grounds, recent head injury)      Had dizziness but this has resolved  10. PREGNANCY: \"Is there any chance you are pregnant?\" \"When was your last menstrual period?\"        " N/A    Protocols used: Vomiting-Adult-OH

## 2025-01-03 NOTE — PROGRESS NOTES
Virtual Regular Visit  Name: Margarito Friday      : 1990      MRN: [id number]  Encounter Provider: Jeferson Fox PA-C  Encounter Date: 1/3/2025   Encounter department: [facility]      Verification of patient location:  Patient is located at Home in the following state in which I hold an active license [place] :  Assessment & Plan  Viral gastroenteritis  Will prescribe Zofran and Imodium for symptoms.  Educated on signs/symptoms of worsening, and red flag symptoms, and is to contact PCP and/or go to ER if these present.  Educated on side effects of medication and is to contact PCP if these present.  Educated on adequate hydration and nutrition.  Educated on diet changes for viral gastroenteritis.  Contact regular PCP if worsening or no improvement in 1 week.  Educated on typical progression of viral gastroenteritis.  Orders:    ondansetron (ZOFRAN) 4 mg tablet; Take 1 tablet (4 mg total) by mouth every 8 (eight) hours as needed for nausea or vomiting    loperamide (IMODIUM A-D) 2 MG tablet; Take 1 tablet (2 mg total) by mouth 4 (four) times a day as needed for diarrhea        Encounter provider Jeferson Fox PA-C    The patient was identified by name and date of birth. Margarito Friday was informed that this is a telemedicine visit and that the visit is being conducted through the Epic Embedded platform. He agrees to proceed..  My office door was closed. No one else was in the room.  He acknowledged consent and understanding of privacy and security of the video platform. The patient has agreed to participate and understands they can discontinue the visit at any time.    Patient is aware this is a billable service.     History of Present Illness     Pt is a 33yo male presenting with N/V and diarrhea for 1 week.  Denies abnormal foods ingested, blood in stool, hematemesis, URI symptoms, urinary symptoms, abdominal pain, fever, chills.  Has not taken anything for this.  S/O who he lives with is  "also sick with same symptoms.      Review of Systems   Constitutional:  Negative for appetite change, chills, diaphoresis, fatigue and fever.   HENT:  Negative for congestion, ear discharge, ear pain, postnasal drip, rhinorrhea, sinus pressure, sinus pain, sneezing and sore throat.    Eyes:  Negative for pain, discharge, redness, itching and visual disturbance.   Respiratory:  Negative for apnea, cough, chest tightness, shortness of breath and wheezing.    Cardiovascular:  Negative for chest pain, palpitations and leg swelling.   Gastrointestinal:  Positive for diarrhea, nausea and vomiting. Negative for abdominal pain, blood in stool and constipation.   Endocrine: Negative for cold intolerance, heat intolerance, polydipsia and polyuria.   Genitourinary:  Negative for dysuria, flank pain, frequency, hematuria and urgency.   Musculoskeletal:  Negative for arthralgias, back pain, myalgias, neck pain and neck stiffness.   Skin:  Negative for color change and rash.   Allergic/Immunologic: Negative.    Neurological:  Negative for dizziness, tremors, seizures, syncope, facial asymmetry, speech difficulty, weakness, light-headedness, numbness and headaches.   Hematological:  Negative for adenopathy. Does not bruise/bleed easily.   Psychiatric/Behavioral:  Negative for agitation, confusion, decreased concentration, dysphoric mood, hallucinations, self-injury, sleep disturbance and suicidal ideas. The patient is not nervous/anxious and is not hyperactive.    All other systems reviewed and are negative.      Objective   Ht 5' 9.5\" (1.765 m)   BMI 31.41 kg/m²     Physical Exam  Constitutional:       General: He is not in acute distress.     Appearance: Normal appearance. He is not ill-appearing, toxic-appearing or diaphoretic.   Pulmonary:      Effort: Pulmonary effort is normal. No respiratory distress.   Neurological:      General: No focal deficit present.      Mental Status: He is alert and oriented to person, place, and " time. Mental status is at baseline.   Psychiatric:         Mood and Affect: Mood normal.         Behavior: Behavior normal.         Thought Content: Thought content normal.         Judgment: Judgment normal.     Unable to complete full PE due to virtual appt.     Visit Time  Total Visit Duration: 4min

## 2025-01-04 RX ORDER — ESCITALOPRAM OXALATE 10 MG/1
10 TABLET ORAL DAILY
Qty: 90 TABLET | Refills: 1 | Status: SHIPPED | OUTPATIENT
Start: 2025-01-04 | End: 2025-07-03

## 2025-01-04 RX ORDER — ESCITALOPRAM OXALATE 20 MG/1
20 TABLET ORAL DAILY
Qty: 90 TABLET | Refills: 1 | Status: SHIPPED | OUTPATIENT
Start: 2025-01-04 | End: 2025-07-03

## 2025-01-28 DIAGNOSIS — F42.2 MIXED OBSESSIONAL THOUGHTS AND ACTS: ICD-10-CM

## 2025-01-28 RX ORDER — ESCITALOPRAM OXALATE 10 MG/1
10 TABLET ORAL DAILY
Qty: 270 TABLET | Refills: 1 | Status: SHIPPED | OUTPATIENT
Start: 2025-01-28 | End: 2025-07-27

## 2025-01-31 ENCOUNTER — TELEMEDICINE (OUTPATIENT)
Dept: PSYCHIATRY | Facility: CLINIC | Age: 35
End: 2025-01-31
Payer: COMMERCIAL

## 2025-01-31 DIAGNOSIS — F41.1 GAD (GENERALIZED ANXIETY DISORDER): ICD-10-CM

## 2025-01-31 DIAGNOSIS — F33.41 RECURRENT MAJOR DEPRESSIVE DISORDER, IN PARTIAL REMISSION (HCC): Primary | ICD-10-CM

## 2025-01-31 PROCEDURE — 99213 OFFICE O/P EST LOW 20 MIN: CPT | Performed by: PHYSICIAN ASSISTANT
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
## 2025-03-29 DIAGNOSIS — F33.1 MODERATE EPISODE OF RECURRENT MAJOR DEPRESSIVE DISORDER (HCC): ICD-10-CM

## 2025-03-29 DIAGNOSIS — F41.1 GAD (GENERALIZED ANXIETY DISORDER): ICD-10-CM

## 2025-03-29 DIAGNOSIS — F42.2 MIXED OBSESSIONAL THOUGHTS AND ACTS: ICD-10-CM

## 2025-03-29 RX ORDER — ESCITALOPRAM OXALATE 20 MG/1
20 TABLET ORAL DAILY
Qty: 90 TABLET | Refills: 1 | Status: SHIPPED | OUTPATIENT
Start: 2025-03-29 | End: 2025-09-25

## 2025-03-31 ENCOUNTER — TELEMEDICINE (OUTPATIENT)
Dept: PSYCHIATRY | Facility: CLINIC | Age: 35
End: 2025-03-31
Payer: COMMERCIAL

## 2025-03-31 DIAGNOSIS — Z86.59 HISTORY OF OCD (OBSESSIVE COMPULSIVE DISORDER): ICD-10-CM

## 2025-03-31 DIAGNOSIS — F33.41 RECURRENT MAJOR DEPRESSIVE DISORDER, IN PARTIAL REMISSION (HCC): Primary | ICD-10-CM

## 2025-03-31 DIAGNOSIS — F41.1 GAD (GENERALIZED ANXIETY DISORDER): ICD-10-CM

## 2025-03-31 PROCEDURE — 99213 OFFICE O/P EST LOW 20 MIN: CPT | Performed by: PHYSICIAN ASSISTANT

## 2025-04-07 NOTE — BH TREATMENT PLAN
TREATMENT PLAN (Medication Management Only)        [facility]    Name and Date of Birth:  Margarito Friday 34 y.o. 1990  MRN: [id number]  Date of Treatment Plan: April 7, 2025  Diagnosis/Diagnoses:    1. Recurrent major depressive disorder, in partial remission (HCC)    2. History of OCD (obsessive compulsive disorder)    3. DEBORA (generalized anxiety disorder)      Strengths/Personal Resources for Self-Care: taking medications as prescribed, ability to adapt to life changes, ability to communicate well, good physical health, good understanding of illness, independence, self-reliance, willingness to work on problems.  Area/Areas of need (in own words): anxiety, depression  1. Long Term Goal:   maintain improvement in depression and anxiety  Target Date:3 months - 7/7/2025  Person/Persons responsible for completion of goal: Margarito  2.  Short Term Objective (s) - How will we reach this goal?:   A.  Provider new recommended medication/dosage changes and/or continue medication(s): continue current medications as prescribed.  B.  Attend medication management appointments regularly..  C.  Take psychiatric medications responsibly..  Target Date:3 months - 7/7/2025  Person/Persons Responsible for Completion of Goal: Margarito  Progress Towards Goals: continuing treatment  Treatment Modality: medication management every 2 months  Review due 180 days from date of this plan: 4 months - 8/7/2025  Expected length of service: ongoing treatment unless revised  My Physician/PA/NP and I have developed this plan together and I agree to work on the goals and objectives. I understand the treatment goals that were developed for my treatment.   Electronic Signatures: on file (unless signed below)    Jennifer Dawkins PA-C 04/07/25
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
## 2025-04-15 DIAGNOSIS — F42.2 MIXED OBSESSIONAL THOUGHTS AND ACTS: ICD-10-CM

## 2025-04-16 RX ORDER — ESCITALOPRAM OXALATE 10 MG/1
10 TABLET ORAL DAILY
Qty: 270 TABLET | Refills: 0 | Status: SHIPPED | OUTPATIENT
Start: 2025-04-16 | End: 2025-10-13

## 2025-05-30 ENCOUNTER — TELEMEDICINE (OUTPATIENT)
Dept: PSYCHIATRY | Facility: CLINIC | Age: 35
End: 2025-05-30
Payer: COMMERCIAL

## 2025-05-30 DIAGNOSIS — F33.41 RECURRENT MAJOR DEPRESSIVE DISORDER, IN PARTIAL REMISSION (HCC): Primary | ICD-10-CM

## 2025-05-30 DIAGNOSIS — F42.2 MIXED OBSESSIONAL THOUGHTS AND ACTS: ICD-10-CM

## 2025-05-30 DIAGNOSIS — F41.1 GAD (GENERALIZED ANXIETY DISORDER): ICD-10-CM

## 2025-05-30 DIAGNOSIS — Z86.59 HISTORY OF OCD (OBSESSIVE COMPULSIVE DISORDER): ICD-10-CM

## 2025-05-30 PROCEDURE — 99213 OFFICE O/P EST LOW 20 MIN: CPT | Performed by: PHYSICIAN ASSISTANT

## 2025-05-30 RX ORDER — ESCITALOPRAM OXALATE 10 MG/1
5 TABLET ORAL DAILY
COMMUNITY
Start: 2025-05-30
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
## 2025-05-30 NOTE — ASSESSMENT & PLAN NOTE
Decrease lexapro 25 mg/d.  EKG pending       Current Outpatient Medications   Medication Sig Dispense Refill    escitalopram (LEXAPRO) 10 mg tablet Take 0.5 tablets (5 mg total) by mouth daily With 20 mg      Ascorbic Acid, Vitamin C, (VITAMIN C) 100 MG tablet Take by mouth daily      escitalopram (LEXAPRO) 20 mg tablet TAKE 1 TABLET (20 MG TOTAL) BY MOUTH DAILY TAKE ONE 10MG TABLET FOR ONE WEEK. THEN, INCREASE TO 20MG FOR ONE WEEK (MAY TAKE ONE 20MG TABLET) THEN, INCREASE TO 30MG TOTAL FOR THE REMAINDER OF PRESCRIPTION (TAKE ONE 10MG TABLET AND ONE 20MG TABLET) 90 tablet 1    loperamide (IMODIUM A-D) 2 MG tablet Take 1 tablet (2 mg total) by mouth 4 (four) times a day as needed for diarrhea 30 tablet 0    omeprazole (PriLOSEC) 40 MG capsule Take 40 mg by mouth 2 (two) times a day      ondansetron (ZOFRAN) 4 mg tablet Take 1 tablet (4 mg total) by mouth every 8 (eight) hours as needed for nausea or vomiting 20 tablet 0    Tremfya 100 MG/ML SOAJ        No current facility-administered medications for this visit.

## 2025-06-30 ENCOUNTER — OFFICE VISIT (OUTPATIENT)
Dept: PSYCHIATRY | Facility: CLINIC | Age: 35
End: 2025-06-30
Payer: COMMERCIAL

## 2025-06-30 DIAGNOSIS — Z86.59 HISTORY OF OCD (OBSESSIVE COMPULSIVE DISORDER): ICD-10-CM

## 2025-06-30 DIAGNOSIS — F33.41 RECURRENT MAJOR DEPRESSIVE DISORDER, IN PARTIAL REMISSION (HCC): Primary | ICD-10-CM

## 2025-06-30 DIAGNOSIS — F41.1 GAD (GENERALIZED ANXIETY DISORDER): ICD-10-CM

## 2025-06-30 PROCEDURE — 99213 OFFICE O/P EST LOW 20 MIN: CPT | Performed by: PHYSICIAN ASSISTANT

## 2025-06-30 RX ORDER — ESCITALOPRAM OXALATE 20 MG/1
20 TABLET ORAL DAILY
COMMUNITY
Start: 2025-06-30
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
## 2025-06-30 NOTE — ASSESSMENT & PLAN NOTE
Decrease lexapro to 20 mg/d.        Current Outpatient Medications   Medication Sig Dispense Refill    escitalopram (LEXAPRO) 20 mg tablet Take 1 tablet (20 mg total) by mouth daily      Ascorbic Acid, Vitamin C, (VITAMIN C) 100 MG tablet Take by mouth daily      omeprazole (PriLOSEC) 40 MG capsule Take 40 mg by mouth 2 (two) times a day      Tremfya 100 MG/ML SOAJ        No current facility-administered medications for this visit.

## 2025-07-15 LAB
ALBUMIN SERPL-MCNC: 4.4 G/DL (ref 3.5–5.7)
ALP SERPL-CCNC: 75 U/L (ref 35–120)
ALT SERPL-CCNC: 52 U/L
ANION GAP SERPL CALCULATED.3IONS-SCNC: 9 MMOL/L (ref 3–11)
AST SERPL-CCNC: 29 U/L
BILIRUB SERPL-MCNC: 0.5 MG/DL (ref 0.2–1)
BUN SERPL-MCNC: 18 MG/DL (ref 7–28)
CALCIUM SERPL-MCNC: 9.8 MG/DL (ref 8.5–10.5)
CHLORIDE SERPL-SCNC: 104 MMOL/L (ref 100–109)
CHOLEST SERPL-MCNC: 186 MG/DL
CHOLEST/HDLC SERPL: 4.3 {RATIO}
CO2 SERPL-SCNC: 25 MMOL/L (ref 21–31)
CREAT SERPL-MCNC: 0.81 MG/DL (ref 0.53–1.3)
CYTOLOGY CMNT CVX/VAG CYTO-IMP: NORMAL
ERYTHROCYTE [DISTWIDTH] IN BLOOD BY AUTOMATED COUNT: 13.4 % (ref 12–16)
EST. AVERAGE GLUCOSE BLD GHB EST-MCNC: 114 MG/DL
GFR/BSA.PRED SERPLBLD CYS-BASED-ARV: 117 ML/MIN/{1.73_M2}
GLUCOSE SERPL-MCNC: 94 MG/DL (ref 65–99)
HBA1C MFR BLD: 5.6 %
HCT VFR BLD AUTO: 44.9 % (ref 37–48)
HDLC SERPL-MCNC: 43 MG/DL (ref 23–92)
HGB BLD-MCNC: 14.9 G/DL (ref 12.5–17)
LDLC SERPL CALC-MCNC: 115 MG/DL
MCH RBC QN AUTO: 28.4 PG (ref 27–36)
MCHC RBC AUTO-ENTMCNC: 33.2 G/DL (ref 32–37)
MCV RBC AUTO: 86 FL (ref 80–100)
NONHDLC SERPL-MCNC: 143 MG/DL
PLATELET # BLD AUTO: 304 THOU/CMM (ref 140–350)
PMV BLD REES-ECKER: 8.7 FL (ref 7.5–11.3)
POTASSIUM SERPL-SCNC: 4.5 MMOL/L (ref 3.5–5.2)
PROT SERPL-MCNC: 7.1 G/DL (ref 6.3–8.3)
RBC # BLD AUTO: 5.24 MILL/CMM (ref 4–5.4)
SODIUM SERPL-SCNC: 138 MMOL/L (ref 135–145)
TRIGL SERPL-MCNC: 140 MG/DL
TSH SERPL-ACNC: 1.22 UIU/ML (ref 0.45–5.33)
WBC # BLD AUTO: 5.7 THOU/CMM (ref 4–10.5)

## 2025-07-30 ENCOUNTER — HOSPITAL ENCOUNTER (OUTPATIENT)
Dept: SLEEP CENTER | Facility: CLINIC | Age: 35
Discharge: HOME | End: 2025-07-30
Payer: COMMERCIAL

## 2025-07-30 DIAGNOSIS — R06.83 SNORING: ICD-10-CM

## 2025-07-30 DIAGNOSIS — R40.0 DAYTIME SOMNOLENCE: ICD-10-CM

## 2025-07-30 PROCEDURE — G0399 HOME SLEEP TEST/TYPE 3 PORTA: HCPCS

## 2025-08-06 PROBLEM — G47.33 OSA (OBSTRUCTIVE SLEEP APNEA): Status: ACTIVE | Noted: 2025-08-06

## 2025-08-11 ENCOUNTER — RESULTS FOLLOW-UP (OUTPATIENT)
Dept: FAMILY MEDICINE CLINIC | Facility: CLINIC | Age: 35
End: 2025-08-11

## 2025-08-11 DIAGNOSIS — R40.0 DAYTIME SOMNOLENCE: Primary | ICD-10-CM

## 2025-08-11 DIAGNOSIS — R06.83 SNORING: ICD-10-CM

## 2025-08-11 DIAGNOSIS — R53.82 CHRONIC FATIGUE: ICD-10-CM

## 2025-08-15 ENCOUNTER — TELEPHONE (OUTPATIENT)
Age: 35
End: 2025-08-15

## 2025-08-18 ENCOUNTER — TRANSCRIBE ORDERS (OUTPATIENT)
Dept: SLEEP CENTER | Facility: CLINIC | Age: 35
End: 2025-08-18

## 2025-08-18 ENCOUNTER — TELEPHONE (OUTPATIENT)
Dept: SLEEP CENTER | Facility: CLINIC | Age: 35
End: 2025-08-18

## 2025-08-18 DIAGNOSIS — R06.83 SNORING: ICD-10-CM

## 2025-08-18 DIAGNOSIS — R40.0 DAYTIME SOMNOLENCE: Primary | ICD-10-CM

## 2025-08-18 DIAGNOSIS — R53.82 CHRONIC FATIGUE: ICD-10-CM
